# Patient Record
Sex: FEMALE | Race: WHITE | NOT HISPANIC OR LATINO | Employment: OTHER | URBAN - METROPOLITAN AREA
[De-identification: names, ages, dates, MRNs, and addresses within clinical notes are randomized per-mention and may not be internally consistent; named-entity substitution may affect disease eponyms.]

---

## 2018-01-18 NOTE — MISCELLANEOUS
Reason For Visit  Reason For Visit Free Text Note Form: Received message that daughter Kaleigh Sage is requesting call from   Call to Kaleigh Sage and left message  Will continue to be available to provide support  Active Problems    1  Chronic obstructive pulmonary disease, unspecified COPD type (496) (J44 9)   2  Degenerative joint disease, multiple joints on both sides of body (715 89) (M15 9)   3  Depression with anxiety (300 4) (F41 8)   4  Gait disturbance (781 2) (R26 9)   5  Malignant neoplasm of lower lobe of right lung (162 5) (C34 31)   6  Mild cognitive impairment (331 83) (G31 84)   7  Need for immunization against influenza (V04 81) (Z23)   8  Need for vaccination with 13-polyvalent pneumococcal conjugate vaccine (V03 82) (Z23)   9  Urine incontinence (788 30) (R32)    Current Meds   1  Advair Diskus 500-50 MCG/DOSE Inhalation Aerosol Powder Breath Activated; Inhale 1   puff twice daily; Therapy: 30CMJ0773 to (Last Rx:16Nov2015)  Requested for: 14SRE4194 Ordered   2  Citalopram Hydrobromide 10 MG Oral Tablet; take 1 tablet by mouth once daily; Therapy: 96MLQ2830 to (Last Rx:05Oct2015)  Requested for: 05Oct2015 Ordered   3  Mirtazapine 7 5 MG Oral Tablet; take one tab one hour before bed; Therapy: 26AHR8906 to (Last Rx:05Oct2015)  Requested for: 05Oct2015 Ordered   4  Spiriva HandiHaler 18 MCG Inhalation Capsule; INHALE CONTENTS OF 1 CAPSULE   ONCE DAILY; Therapy: 53SMF0003 to (Evaluate:10Nov2016)  Requested for: 49MTX5876; Last   Rx:16Nov2015 Ordered    Allergies    1   Penicillins   2  sulfa    Signatures   Electronically signed by : JEFF Bosch; Jorge Luis 15 2016 10:58AM EST                       (Author)

## 2018-09-24 ENCOUNTER — TELEPHONE (OUTPATIENT)
Dept: FAMILY MEDICINE CLINIC | Facility: CLINIC | Age: 78
End: 2018-09-24

## 2018-09-25 ENCOUNTER — TELEPHONE (OUTPATIENT)
Dept: FAMILY MEDICINE CLINIC | Facility: CLINIC | Age: 78
End: 2018-09-25

## 2018-11-06 ENCOUNTER — HOSPITAL ENCOUNTER (EMERGENCY)
Facility: HOSPITAL | Age: 78
Discharge: HOME | End: 2018-11-06
Attending: EMERGENCY MEDICINE | Admitting: EMERGENCY MEDICINE
Payer: MEDICARE

## 2018-11-06 ENCOUNTER — APPOINTMENT (EMERGENCY)
Dept: RADIOLOGY | Facility: HOSPITAL | Age: 78
End: 2018-11-06
Payer: MEDICARE

## 2018-11-06 VITALS
DIASTOLIC BLOOD PRESSURE: 60 MMHG | RESPIRATION RATE: 18 BRPM | TEMPERATURE: 96.8 F | BODY MASS INDEX: 40.48 KG/M2 | SYSTOLIC BLOOD PRESSURE: 128 MMHG | HEIGHT: 62 IN | WEIGHT: 220 LBS | HEART RATE: 70 BPM | OXYGEN SATURATION: 100 %

## 2018-11-06 DIAGNOSIS — M87.052 AVASCULAR NECROSIS OF BONES OF BOTH HIPS (HCC): Primary | ICD-10-CM

## 2018-11-06 DIAGNOSIS — M87.051 AVASCULAR NECROSIS OF BONES OF BOTH HIPS (HCC): Primary | ICD-10-CM

## 2018-11-06 PROCEDURE — 99284 EMERGENCY DEPT VISIT MOD MDM: CPT

## 2018-11-06 PROCEDURE — 72170 X-RAY EXAM OF PELVIS: CPT

## 2018-11-06 RX ORDER — IBUPROFEN 600 MG/1
600 TABLET ORAL EVERY 6 HOURS PRN
Qty: 30 TABLET | Refills: 0 | Status: SHIPPED | OUTPATIENT
Start: 2018-11-06

## 2018-11-06 RX ORDER — POTASSIUM CHLORIDE 750 MG/1
10 TABLET, EXTENDED RELEASE ORAL DAILY
COMMUNITY
End: 2018-12-28 | Stop reason: HOSPADM

## 2018-11-06 RX ORDER — CITALOPRAM 20 MG/1
20 TABLET ORAL DAILY
COMMUNITY

## 2018-11-06 RX ORDER — DONEPEZIL HYDROCHLORIDE 10 MG/1
10 TABLET, FILM COATED ORAL
COMMUNITY

## 2018-11-06 RX ORDER — ALBUTEROL SULFATE 90 UG/1
2 AEROSOL, METERED RESPIRATORY (INHALATION) EVERY 4 HOURS PRN
COMMUNITY

## 2018-11-06 RX ORDER — FUROSEMIDE 40 MG/1
40 TABLET ORAL DAILY
COMMUNITY
Start: 2018-11-02 | End: 2018-12-28 | Stop reason: HOSPADM

## 2018-11-06 RX ORDER — BIOTIN 1 MG
1 TABLET ORAL DAILY
COMMUNITY

## 2018-11-06 RX ORDER — BUDESONIDE AND FORMOTEROL FUMARATE DIHYDRATE 160; 4.5 UG/1; UG/1
2 AEROSOL RESPIRATORY (INHALATION) 2 TIMES DAILY
COMMUNITY

## 2018-11-06 RX ORDER — RISPERIDONE 0.5 MG/1
0.5 TABLET, FILM COATED ORAL
COMMUNITY

## 2018-11-06 RX ADMIN — IBUPROFEN 600 MG: 400 TABLET ORAL at 09:30

## 2018-11-06 NOTE — DISCHARGE INSTRUCTIONS
Arthritis   WHAT YOU NEED TO KNOW:   What is arthritis? Arthritis is a disease that causes inflammation in one or more joints  There are many types of arthritis, such as osteoarthritis, rheumatoid arthritis, and septic arthritis  Some types cause inflammation in the joints  Other types wear away the cartilage between joints  This makes the bones of the joint rub together when you move the joint  Your symptoms may be constant, or symptoms may come and go  Arthritis often gets worse over time and can cause permanent joint damage  What increases my risk for arthritis? · A family history of arthritis    · Infection, trauma, or injury to the joint    · Obesity    · A disease such as diabetes, heart disease, hypothyroidism, or psoriasis    · An immune deficiency disorder, such as AIDS or lupus  What are the signs and symptoms of arthritis? · Pain, swelling, or stiffness in the joint    · Limited range of motion in the joint    · Warmth or redness over the joint    · Tenderness when you touch the joint    · Stiff joints in the morning that loosen with movement    · A creaking or grinding sound when you move the joint    · Fever  How is arthritis diagnosed? · Blood tests  are used to measure the amount of inflammation in your body  · An x-ray, CT, MRI, or ultrasound  may be used to check for joint damage, swelling, or loss of bone  Do not enter the MRI room with anything metal  Metal can cause serious damage  Tell the healthcare provider if you have any metal in or on your body  · A sample  of the fluid in the joint may be tested for uric acid or calcium crystals, or for signs of infection  How is arthritis treated? Treatment will depend on the type of arthritis you have and if it is severe  You may need any of the following:  · Acetaminophen  decreases pain and fever  It is available without a doctor's order  Ask how much to take and how often to take it  Follow directions   Acetaminophen can cause liver damage if not taken correctly  · NSAIDs , such as ibuprofen, help decrease swelling, pain, and fever  This medicine is available with or without a doctor's order  NSAIDs can cause stomach bleeding or kidney problems in certain people  If you take blood thinner medicine, always ask your healthcare provider if NSAIDs are safe for you  Always read the medicine label and follow directions  · Steroid medicine  helps reduce swelling and pain  · Surgery  may be needed to repair or replace a damaged joint  What can I do to manage arthritis? · Rest your painful joint so it can heal   Your healthcare provider may recommend crutches or a walker if the affected joint is in a leg  · Apply ice or heat to the joint  Both can help decrease swelling and pain  Ice may also help prevent tissue damage  Use an ice pack, or put crushed ice in a plastic bag  Cover it with a towel and place it on your joint for 15 to 20 minutes every hour or as directed  You can apply heat for 20 minutes every 2 hours  Heat treatment includes hot packs or heat lamps  · Elevate your joint  Elevation helps reduce swelling and pain  Raise your joint above the level of your heart as often as you can  Prop your painful joint on pillows to keep it above your heart comfortably  · Go to therapy as directed  A physical therapist can teach you exercises to improve flexibility and range of motion  You may also be shown non-weight-bearing exercises that are safe for your joints, such as swimming  Exercise can help keep your joints flexible and reduce pain  An occupational therapist can help you learn to do your daily activities when your joints are stiff or sore  · Maintain a healthy weight  Extra weight puts increased pressure on your joints  Ask your healthcare provider what you should weigh   If you need to lose weight, he can help you create a weight loss program  Weight loss can help reduce pain and increase your ability to do your activities  The amount of exercise you do may vary each day, depending on your symptoms  · Wear flat or low-heeled shoes  This will help decrease pain and reduce pressure on your ankle, knee, and hip joints  · Use support devices  You may be given splints to wear on your hands to help your joints rest and to decrease inflammation  While you sleep, use a pillow that is firm enough to support your neck and head  What other equipment should I use? The following may help you move and prevent falls:  · Orthotic shoes or insoles  help support your feet when you walk  · Crutches, a cane, or a walker  may help decrease your risk for falling  They also decrease stress on affected joints  · Devices to prevent falls  include raised toilet seats and bathtub bars to help you get up from sitting  Handrails can be placed in areas where you need balance and support  When should I seek immediate care? · You have a fever and severe joint pain or swelling  · You cannot move the affected joint  · You have severe joint pain you cannot tolerate  When should I contact my healthcare provider? · Your pain or swelling does not get better with treatment  · You have questions or concerns about your condition or care  CARE AGREEMENT:   You have the right to help plan your care  Learn about your health condition and how it may be treated  Discuss treatment options with your caregivers to decide what care you want to receive  You always have the right to refuse treatment  The above information is an  only  It is not intended as medical advice for individual conditions or treatments  Talk to your doctor, nurse or pharmacist before following any medical regimen to see if it is safe and effective for you  © 2017 [employer] Information is for End User's use only and may not be sold, redistributed or otherwise used for commercial purposes   All illustrations and images included in CareNotes® are the copyrighted property of [employer]  or [employer]  Hip Pain   WHAT YOU NEED TO KNOW:   What causes hip pain? Hip pain can be caused by a number of conditions, such as bursitis, arthritis, or muscle or tendon strain  You may have swelling in the fluid-filled sacs that protect your muscles and tendons  Hip pain can also be caused by a lower back problem  Hip pain may be caused by trauma, playing sports, or running  Your pain may start in your hip and go to your thigh, buttock, or groin  How is hip pain treated? You may need x-rays to make sure there are no broken bones  You may be given NSAIDs to help decrease pain and swelling  How can I manage hip pain? · Rest  your injured hip so that it can heal  You may need to avoid putting any weight on your hip for at least 48 hours  Return to normal activities as directed  · Ice  the injury for 20 minutes every 4 hours, or as directed  Use an ice pack, or put crushed ice in a plastic bag  Cover it with a towel to protect your skin  Ice helps prevent tissue damage and decreases swelling and pain  · Elevate  your injured hip above the level of your heart as often as you can  This will help decrease swelling and pain  If possible, prop your hip and leg on pillows or blankets to keep the area elevated comfortably  · Maintain a healthy weight  Extra body weight can cause pressure and pain in your hip, knee, and ankle joints  Ask your healthcare provider how much you should weigh  Ask him to help you create a weight loss plan if you are overweight  · Use assistive devices as directed  You may need to use a cane or crutches  Assistive devices help decrease pain and pressure on your hip when you walk  Ask your healthcare provider for more information about assistive devices and how to use them correctly  When should I seek immediate care? · Your pain gets worse  · You have numbness in your leg or toes      · You cannot put any weight on or move your hip  When should I contact my healthcare provider? · You have a fever  · Your pain does not decrease, even after treatment  · You have questions or concerns about your condition or care  CARE AGREEMENT:   You have the right to help plan your care  Learn about your health condition and how it may be treated  Discuss treatment options with your caregivers to decide what care you want to receive  You always have the right to refuse treatment  The above information is an  only  It is not intended as medical advice for individual conditions or treatments  Talk to your doctor, nurse or pharmacist before following any medical regimen to see if it is safe and effective for you  © 2017 [facility] Information is for End User's use only and may not be sold, redistributed or otherwise used for commercial purposes  All illustrations and images included in CareNotes® are the copyrighted property of [employer]  or [employer]

## 2018-11-06 NOTE — ED NOTES
Patient assisted to standing position and then ambulated, slowly, with roller walker with stand by assist  Patient appeared steady after first few steps needing reminder at first what to do        Kassandra Guillaume RN  11/06/18 2572

## 2018-11-06 NOTE — ED TRIAGE NOTES
Patient arrives with EMS from [facility]  Staff states patient is newly unable to walk due to left hip and right leg pain  Patient states has "old age" Tylenol was administered an hour prior to arrival  Denies trauma  Chronically wears 2L O2 at home  Hx- Dementia, COPD, gait disturbance

## 2018-11-06 NOTE — ED PROVIDER NOTES
History  Chief Complaint   Patient presents with    Hip Pain     Patient is an 78-year-old woman who has a chronic gait disturbance as well as hip pain who says that her hip pain is somewhat worse for the past several days and it is difficult for her to ambulate  Patient has had no recent injuries  She says she is able to transfer  She has no fevers chills  No nausea vomiting  No other complaints  She says she was sent here by the nursing home nurses            Prior to Admission Medications   Prescriptions Last Dose Informant Patient Reported? Taking? Cholecalciferol (VITAMIN D3) 1000 units CAPS   Yes Yes   Sig: Take 1 capsule by mouth daily   albuterol (PROVENTIL HFA,VENTOLIN HFA) 90 mcg/act inhaler   Yes Yes   Sig: Inhale 2 puffs every 4 (four) hours as needed for wheezing   budesonide-formoterol (SYMBICORT) 160-4 5 mcg/act inhaler   Yes Yes   Sig: Inhale 2 puffs 2 (two) times a day Rinse mouth after use  citalopram (CeleXA) 20 mg tablet   Yes Yes   Sig: Take 10 mg by mouth daily   donepezil (ARICEPT) 10 mg tablet   Yes Yes   Sig: Take 10 mg by mouth daily at bedtime   furosemide (LASIX) 40 mg tablet   Yes Yes   Sig: Take 40 mg by mouth daily For 7 days for edema   potassium chloride (K-DUR,KLOR-CON) 10 mEq tablet   Yes Yes   Sig: Take 10 mEq by mouth daily   risperiDONE (RisperDAL) 0 5 mg tablet   Yes Yes   Sig: Take 0 5 mg by mouth daily at bedtime   umeclidinium bromide (INCRUSE ELLIPTA) 62 5 mcg/inh AEPB inhaler   Yes Yes   Sig: Inhale 1 puff daily      Facility-Administered Medications: None       Past Medical History:   Diagnosis Date    Anxiety     COPD (chronic obstructive pulmonary disease) (HCC)     Dementia     DJD (degenerative joint disease)     Gait disturbance     Hypertension     Insomnia     Major depression     Vitamin B12 deficiency     Vitamin D deficiency        History reviewed  No pertinent surgical history  History reviewed  No pertinent family history    I have reviewed and agree with the history as documented  Social History   Substance Use Topics    Smoking status: Unknown If Ever Smoked    Smokeless tobacco: Never Used    Alcohol use No        Review of Systems   Constitutional: Negative for chills, fatigue, fever and unexpected weight change  HENT: Negative for congestion and nosebleeds  Eyes: Negative for visual disturbance  Respiratory: Negative for chest tightness and shortness of breath  Cardiovascular: Negative for chest pain, palpitations and leg swelling  Gastrointestinal: Negative for abdominal pain, blood in stool, diarrhea, nausea and vomiting  Endocrine: Negative for cold intolerance and heat intolerance  Genitourinary: Negative for difficulty urinating  Musculoskeletal: Negative for arthralgias, back pain, gait problem, joint swelling and myalgias  Skin: Negative for rash  Neurological: Negative for dizziness, speech difficulty, weakness and headaches  Psychiatric/Behavioral: Negative for behavioral problems, confusion, self-injury and suicidal ideas  All other systems reviewed and are negative  Physical Exam  Physical Exam   Constitutional: She is oriented to person, place, and time  She appears well-developed and well-nourished  HENT:   Head: Normocephalic and atraumatic  Nose: Nose normal    Eyes: Pupils are equal, round, and reactive to light  EOM are normal    Neck: Normal range of motion  Neck supple  Cardiovascular: Normal rate, regular rhythm and normal heart sounds  Exam reveals no gallop and no friction rub  No murmur heard  Pulmonary/Chest: Effort normal and breath sounds normal  No respiratory distress  She has no wheezes  She has no rales  Abdominal: Soft  She exhibits no distension  There is no tenderness  There is no rebound and no guarding  Musculoskeletal: Normal range of motion  She exhibits edema (1+)  She exhibits no deformity     Neurological: She is alert and oriented to person, place, and time  Skin: Skin is warm and dry  Psychiatric: She has a normal mood and affect  Her behavior is normal  Judgment and thought content normal    Nursing note and vitals reviewed  Vital Signs  ED Triage Vitals   Temperature Pulse Respirations Blood Pressure SpO2   11/06/18 0855 11/06/18 0855 11/06/18 0855 11/06/18 0855 11/06/18 0855   (!) 96 8 °F (36 °C) 70 18 128/60 100 %      Temp Source Heart Rate Source Patient Position - Orthostatic VS BP Location FiO2 (%)   11/06/18 0855 11/06/18 0855 11/06/18 0855 11/06/18 0855 --   Tympanic Monitor Lying Right arm       Pain Score       11/06/18 0930       5           Vitals:    11/06/18 0855   BP: 128/60   Pulse: 70   Patient Position - Orthostatic VS: Lying       Visual Acuity      ED Medications  Medications   ibuprofen (MOTRIN) tablet 600 mg (600 mg Oral Given 11/6/18 0930)       Diagnostic Studies  Results Reviewed     None                 XR pelvis ap only 1 or 2 vw   Final Result by Nora Carrasco (11/06 0943)   Avascular necrosis left femoral head with slight collapse and with   superimposed DJD  Signed by Ronan Alexandre MD                 Procedures  Procedures       Phone Contacts  ED Phone Contact    ED Course  ED Course as of Nov 06 1237   Tue Nov 06, 2018   0957 I spoke with her NH who was aware her of avascular necrosis  And that she is able to ambulate with a walker  She is safe for d/c                                MDM  CritCare Time    Disposition  Final diagnoses:   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 ([address] )     Time reflects when diagnosis was documented in both MDM as applicable and the Disposition within this note     Time User Action Codes Description Comment    11/6/2018 10:05 AM Pancho Summers Add [M87 051,  M87 052] 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 [facility])       ED Disposition     ED Disposition Condition Comment    Discharge  [place] discharge to home/self care      Condition at discharge: Good        Follow-up Information     Follow up With Specialties Details [address], MD Geriatric Medicine   [place] [id number]      Jeanna Menjivar DO Orthopedic Surgery   [address]  [facility]  [address] [address]  [address] [id number] [phone]            Discharge Medication List as of 11/6/2018 10:08 AM      START taking these medications    Details   ibuprofen (MOTRIN) 600 mg tablet Take 1 tablet (600 mg total) by mouth every 6 (six) hours as needed for moderate pain, Starting Tue 11/6/2018, Print         CONTINUE these medications which have NOT CHANGED    Details   albuterol (PROVENTIL HFA,VENTOLIN HFA) 90 mcg/act inhaler Inhale 2 puffs every 4 (four) hours as needed for wheezing, Historical Med      budesonide-formoterol (SYMBICORT) 160-4 5 mcg/act inhaler Inhale 2 puffs 2 (two) times a day Rinse mouth after use , Historical Med      Cholecalciferol (VITAMIN D3) 1000 units CAPS Take 1 capsule by mouth daily, Historical Med      citalopram (CeleXA) 20 mg tablet Take 10 mg by mouth daily, Historical Med      donepezil (ARICEPT) 10 mg tablet Take 10 mg by mouth daily at bedtime, Historical Med      furosemide (LASIX) 40 mg tablet Take 40 mg by mouth daily For 7 days for edema, Starting Fri 11/2/2018, Until Fri 11/9/2018, Historical Med      potassium chloride (K-DUR,KLOR-CON) 10 mEq tablet Take 10 mEq by mouth daily, Historical Med      risperiDONE (RisperDAL) 0 5 mg tablet Take 0 5 mg by mouth daily at bedtime, Historical Med      umeclidinium bromide (INCRUSE ELLIPTA) 62 5 mcg/inh AEPB inhaler Inhale 1 puff daily, Historical Med           No discharge procedures on file      ED Provider  Electronically Signed by           Kevin Norwood MD  11/06/18 Valentino Simpson MD  11/06/18 3534

## 2018-11-08 ENCOUNTER — HOSPITAL ENCOUNTER (EMERGENCY)
Facility: HOSPITAL | Age: 78
Discharge: HOME | End: 2018-11-08
Attending: EMERGENCY MEDICINE | Admitting: EMERGENCY MEDICINE
Payer: MEDICARE

## 2018-11-08 ENCOUNTER — APPOINTMENT (EMERGENCY)
Dept: CT IMAGING | Facility: HOSPITAL | Age: 78
End: 2018-11-08
Payer: MEDICARE

## 2018-11-08 VITALS
BODY MASS INDEX: 41.54 KG/M2 | TEMPERATURE: 98.2 F | SYSTOLIC BLOOD PRESSURE: 129 MMHG | RESPIRATION RATE: 16 BRPM | OXYGEN SATURATION: 95 % | HEIGHT: 61 IN | DIASTOLIC BLOOD PRESSURE: 59 MMHG | WEIGHT: 220.02 LBS | HEART RATE: 75 BPM

## 2018-11-08 DIAGNOSIS — R10.32 LEFT LOWER QUADRANT PAIN: Primary | ICD-10-CM

## 2018-11-08 LAB
ALBUMIN SERPL BCP-MCNC: 3.7 G/DL (ref 3.5–5)
ALP SERPL-CCNC: 59 U/L (ref 46–116)
ALT SERPL W P-5'-P-CCNC: 18 U/L (ref 12–78)
ANION GAP SERPL CALCULATED.3IONS-SCNC: 6 MMOL/L (ref 4–13)
AST SERPL W P-5'-P-CCNC: 29 U/L (ref 5–45)
BASOPHILS # BLD AUTO: 0.03 THOUSANDS/ΜL (ref 0–0.1)
BASOPHILS NFR BLD AUTO: 1 % (ref 0–1)
BILIRUB DIRECT SERPL-MCNC: 0.09 MG/DL (ref 0–0.2)
BILIRUB SERPL-MCNC: 0.8 MG/DL (ref 0.2–1)
BILIRUB UR QL STRIP: NEGATIVE
BUN SERPL-MCNC: 19 MG/DL (ref 5–25)
CALCIUM SERPL-MCNC: 9 MG/DL (ref 8.3–10.1)
CHLORIDE SERPL-SCNC: 100 MMOL/L (ref 100–108)
CLARITY UR: CLEAR
CO2 SERPL-SCNC: 34 MMOL/L (ref 21–32)
COLOR UR: YELLOW
CREAT SERPL-MCNC: 1.03 MG/DL (ref 0.6–1.3)
EOSINOPHIL # BLD AUTO: 0.04 THOUSAND/ΜL (ref 0–0.61)
EOSINOPHIL NFR BLD AUTO: 1 % (ref 0–6)
ERYTHROCYTE [DISTWIDTH] IN BLOOD BY AUTOMATED COUNT: 13.2 % (ref 11.6–15.1)
GFR SERPL CREATININE-BSD FRML MDRD: 51 ML/MIN/1.73SQ M
GLUCOSE SERPL-MCNC: 103 MG/DL (ref 65–140)
GLUCOSE UR STRIP-MCNC: NEGATIVE MG/DL
HCT VFR BLD AUTO: 43.6 % (ref 34.8–46.1)
HGB BLD-MCNC: 14.1 G/DL (ref 11.5–15.4)
HGB UR QL STRIP.AUTO: NEGATIVE
IMM GRANULOCYTES # BLD AUTO: 0.03 THOUSAND/UL (ref 0–0.2)
IMM GRANULOCYTES NFR BLD AUTO: 1 % (ref 0–2)
KETONES UR STRIP-MCNC: NEGATIVE MG/DL
LEUKOCYTE ESTERASE UR QL STRIP: NEGATIVE
LYMPHOCYTES # BLD AUTO: 0.68 THOUSANDS/ΜL (ref 0.6–4.47)
LYMPHOCYTES NFR BLD AUTO: 12 % (ref 14–44)
MCH RBC QN AUTO: 29.7 PG (ref 26.8–34.3)
MCHC RBC AUTO-ENTMCNC: 32.3 G/DL (ref 31.4–37.4)
MCV RBC AUTO: 92 FL (ref 82–98)
MONOCYTES # BLD AUTO: 0.39 THOUSAND/ΜL (ref 0.17–1.22)
MONOCYTES NFR BLD AUTO: 7 % (ref 4–12)
NEUTROPHILS # BLD AUTO: 4.67 THOUSANDS/ΜL (ref 1.85–7.62)
NEUTS SEG NFR BLD AUTO: 78 % (ref 43–75)
NITRITE UR QL STRIP: NEGATIVE
NRBC BLD AUTO-RTO: 0 /100 WBCS
PH UR STRIP.AUTO: 5.5 [PH] (ref 4.5–8)
PLATELET # BLD AUTO: 260 THOUSANDS/UL (ref 149–390)
PMV BLD AUTO: 10.5 FL (ref 8.9–12.7)
POTASSIUM SERPL-SCNC: 5.3 MMOL/L (ref 3.5–5.3)
PROT SERPL-MCNC: 8.2 G/DL (ref 6.4–8.2)
PROT UR STRIP-MCNC: NEGATIVE MG/DL
RBC # BLD AUTO: 4.75 MILLION/UL (ref 3.81–5.12)
SODIUM SERPL-SCNC: 140 MMOL/L (ref 136–145)
SP GR UR STRIP.AUTO: <=1.005 (ref 1–1.03)
UROBILINOGEN UR QL STRIP.AUTO: 0.2 E.U./DL
WBC # BLD AUTO: 5.84 THOUSAND/UL (ref 4.31–10.16)

## 2018-11-08 PROCEDURE — 80048 BASIC METABOLIC PNL TOTAL CA: CPT | Performed by: EMERGENCY MEDICINE

## 2018-11-08 PROCEDURE — 74177 CT ABD & PELVIS W/CONTRAST: CPT

## 2018-11-08 PROCEDURE — 85025 COMPLETE CBC W/AUTO DIFF WBC: CPT | Performed by: EMERGENCY MEDICINE

## 2018-11-08 PROCEDURE — 81003 URINALYSIS AUTO W/O SCOPE: CPT | Performed by: EMERGENCY MEDICINE

## 2018-11-08 PROCEDURE — 36415 COLL VENOUS BLD VENIPUNCTURE: CPT | Performed by: EMERGENCY MEDICINE

## 2018-11-08 PROCEDURE — 99285 EMERGENCY DEPT VISIT HI MDM: CPT

## 2018-11-08 PROCEDURE — 80076 HEPATIC FUNCTION PANEL: CPT | Performed by: EMERGENCY MEDICINE

## 2018-11-08 RX ORDER — ACETAMINOPHEN 500 MG
500 TABLET ORAL EVERY 6 HOURS PRN
Qty: 30 TABLET | Refills: 0 | Status: SHIPPED | OUTPATIENT
Start: 2018-11-08

## 2018-11-08 RX ORDER — ACETAMINOPHEN 500 MG
500 TABLET ORAL EVERY 6 HOURS PRN
Qty: 30 TABLET | Refills: 0 | Status: SHIPPED | OUTPATIENT
Start: 2018-11-08 | End: 2018-11-08

## 2018-11-08 RX ORDER — ACETAMINOPHEN 325 MG/1
650 TABLET ORAL ONCE
Status: COMPLETED | OUTPATIENT
Start: 2018-11-08 | End: 2018-11-08

## 2018-11-08 RX ADMIN — IOHEXOL 100 ML: 350 INJECTION, SOLUTION INTRAVENOUS at 15:29

## 2018-11-08 RX ADMIN — ACETAMINOPHEN 650 MG: 325 TABLET, FILM COATED ORAL at 14:33

## 2018-11-08 NOTE — DISCHARGE INSTRUCTIONS
Acute Abdominal Pain   AMBULATORY CARE:   Acute abdominal pain  usually starts suddenly and gets worse quickly  Seek care immediately if:   · You vomit blood or cannot stop vomiting  · You have blood in your bowel movement or it looks like tar  · You have bleeding from your rectum  · Your abdomen is larger than usual, more painful, and hard  · You have severe pain in your abdomen  · You stop passing gas and having bowel movements  · You feel weak, dizzy, or faint  Contact your healthcare provider if:   · You have a fever  · You have new signs and symptoms  · Your symptoms do not get better with treatment  · You have questions or concerns about your condition or care  Treatment for acute abdominal pain  may depend on the cause of your abdominal pain  You may need any of the following:  · Medicines  may be given to decrease pain, treat an infection, and manage your symptoms, such as constipation  · Surgery  may be needed to treat a serious cause of abdominal pain  Examples include surgery to treat appendicitis or a blockage in your bowels  Manage your symptoms:   · Apply heat  on your abdomen for 20 to 30 minutes every 2 hours for as many days as directed  Heat helps decrease pain and muscle spasms  · Manage your stress  Stress may cause abdominal pain  Your healthcare provider may recommend relaxation techniques and deep breathing exercises to help decrease your stress  Your healthcare provider may recommend you talk to someone about your stress or anxiety, such as a counselor or a trusted friend  Get plenty of sleep and exercise regularly  · Limit or do not drink alcohol  Alcohol can make your abdominal pain worse  Ask your healthcare provider if it is safe for you to drink alcohol  Also ask how much is safe for you to drink  · Do not smoke  Nicotine and other chemicals in cigarettes can damage your esophagus and stomach   Ask your healthcare provider for information if you currently smoke and need help to quit  E-cigarettes or smokeless tobacco still contain nicotine  Talk to your healthcare provider before you use these products  Make changes to the food you eat as directed:  Do not eat foods that cause abdominal pain or other symptoms  Eat small meals more often  · Eat more high-fiber foods if you are constipated  High-fiber foods include fruits, vegetables, whole-grain foods, and legumes  · Do not eat foods that cause gas if you have bloating  Examples include broccoli, cabbage, and cauliflower  Do not drink soda or carbonated drinks, because these may also cause gas  · Do not eat foods or drinks that contain sorbitol or fructose if you have diarrhea and bloating  Some examples are fruit juices, candy, jelly, and sugar-free gum  · Do not eat high-fat foods, such as fried foods, cheeseburgers, hot dogs, and desserts  · Limit or do not drink caffeine  Caffeine may make symptoms, such as heart burn or nausea, worse  · Drink plenty of liquids to prevent dehydration from diarrhea or vomiting  Ask your healthcare provider how much liquid to drink each day and which liquids are best for you  Follow up with your healthcare provider as directed:  Write down your questions so you remember to ask them during your visits  © 2017 [employer] Information is for End User's use only and may not be sold, redistributed or otherwise used for commercial purposes  All illustrations and images included in CareNotes® are the copyrighted property of [employer]  or [employer]  The above information is an  only  It is not intended as medical advice for individual conditions or treatments  Talk to your doctor, nurse or pharmacist before following any medical regimen to see if it is safe and effective for you

## 2018-11-08 NOTE — ED PROVIDER NOTES
History  Chief Complaint   Patient presents with    Weakness - Generalized     pt brought in by EMS from [facility] care, according to Goyo Meza patient has a change in mental status and is complaining of belly pain  Patient was seen at [place] on 11/6 and at [facility] on 11/7  Katrina Gan When asked patient why she is here, she states i dont know, everything hurts  HPI  15-year-old female past medical history left hip avascular necrosis presenting with abdominal pain which she states is in her left lower quadrant and started today  She has history of dementia and states that she cannot remember if she had this before  She was seen at the ED in the [place] 2 days ago for left hip pain, the x-ray showed avascular necrosis of hips which has been chronic  History limited due to dementia  Prior to Admission Medications   Prescriptions Last Dose Informant Patient Reported? Taking? Cholecalciferol (VITAMIN D3) 1000 units CAPS   Yes No   Sig: Take 1 capsule by mouth daily   albuterol (PROVENTIL HFA,VENTOLIN HFA) 90 mcg/act inhaler   Yes No   Sig: Inhale 2 puffs every 4 (four) hours as needed for wheezing   budesonide-formoterol (SYMBICORT) 160-4 5 mcg/act inhaler   Yes No   Sig: Inhale 2 puffs 2 (two) times a day Rinse mouth after use     citalopram (CeleXA) 20 mg tablet   Yes No   Sig: Take 10 mg by mouth daily   donepezil (ARICEPT) 10 mg tablet   Yes No   Sig: Take 10 mg by mouth daily at bedtime   furosemide (LASIX) 40 mg tablet   Yes No   Sig: Take 40 mg by mouth daily For 7 days for edema   ibuprofen (MOTRIN) 600 mg tablet   No No   Sig: Take 1 tablet (600 mg total) by mouth every 6 (six) hours as needed for moderate pain   potassium chloride (K-DUR,KLOR-CON) 10 mEq tablet   Yes No   Sig: Take 10 mEq by mouth daily   risperiDONE (RisperDAL) 0 5 mg tablet   Yes No   Sig: Take 0 5 mg by mouth daily at bedtime   umeclidinium bromide (INCRUSE ELLIPTA) 62 5 mcg/inh AEPB inhaler   Yes No   Sig: Inhale 1 puff daily Facility-Administered Medications: None       Past Medical History:   Diagnosis Date    Anxiety     COPD (chronic obstructive pulmonary disease) (HCC)     Dementia     DJD (degenerative joint disease)     Gait disturbance     Hypertension     Insomnia     Major depression     Vitamin B12 deficiency     Vitamin D deficiency        History reviewed  No pertinent surgical history  History reviewed  No pertinent family history  I have reviewed and agree with the history as documented  Social History   Substance Use Topics    Smoking status: Unknown If Ever Smoked    Smokeless tobacco: Never Used    Alcohol use No        Review of Systems   Unable to perform ROS: Dementia   Gastrointestinal: Positive for abdominal pain  Physical Exam  Physical Exam   Constitutional: She appears well-developed and well-nourished  No distress  HENT:   Head: Atraumatic  Right Ear: External ear normal    Left Ear: External ear normal    Nose: Nose normal    Eyes: Pupils are equal, round, and reactive to light  Conjunctivae and EOM are normal    Neck: Normal range of motion  Neck supple  No JVD present  Cardiovascular: Normal rate, regular rhythm and normal heart sounds  No murmur heard  Pulmonary/Chest: Effort normal and breath sounds normal  No respiratory distress  She has no wheezes  Abdominal: Soft  Bowel sounds are normal  She exhibits no distension  There is no tenderness  No appreciable tenderness to palpation on exam   Musculoskeletal: Normal range of motion  She exhibits no edema  Neurological: She is alert  No cranial nerve deficit  Oriented to person and place but not time   Skin: Skin is warm and dry  Capillary refill takes less than 2 seconds  She is not diaphoretic  Psychiatric: She has a normal mood and affect  Her behavior is normal    Nursing note and vitals reviewed        Vital Signs  ED Triage Vitals [11/08/18 1359]   Temperature Pulse Respirations Blood Pressure SpO2   98 2 °F (36 8 °C) 67 16 130/67 97 %      Temp Source Heart Rate Source Patient Position - Orthostatic VS BP Location FiO2 (%)   Oral Monitor Sitting Right arm --      Pain Score       --           Vitals:    11/08/18 1359 11/08/18 1500   BP: 130/67 105/55   Pulse: 67 60   Patient Position - Orthostatic VS: Sitting Lying       Visual Acuity      ED Medications  Medications   acetaminophen (TYLENOL) tablet 650 mg (650 mg Oral Given 11/8/18 1433)   iohexol (OMNIPAQUE) 350 MG/ML injection (MULTI-DOSE) 100 mL (100 mL Intravenous Given 11/8/18 1529)       Diagnostic Studies  Results Reviewed     Procedure Component Value Units Date/Time    Basic metabolic panel [[id number]]  (Abnormal) Collected:  11/08/18 1430    Lab Status:  Final result Specimen:  Blood from Arm, Left Updated:  11/08/18 1454     Sodium 140 mmol/L      Potassium 5 3 mmol/L      Chloride 100 mmol/L      CO2 34 (H) mmol/L      ANION GAP 6 mmol/L      BUN 19 mg/dL      Creatinine 1 03 mg/dL      Glucose 103 mg/dL      Calcium 9 0 mg/dL      eGFR 51 ml/min/1 73sq m     Narrative:         National Kidney Disease Education Program recommendations are as follows:  GFR calculation is accurate only with a steady state creatinine  Chronic Kidney disease less than 60 ml/min/1 73 sq  meters  Kidney failure less than 15 ml/min/1 73 sq  meters      Hepatic function panel [[id number]]  (Normal) Collected:  11/08/18 1430    Lab Status:  Final result Specimen:  Blood from Arm, Left Updated:  11/08/18 1454     Total Bilirubin 0 80 mg/dL      Bilirubin, Direct 0 09 mg/dL      Alkaline Phosphatase 59 U/L      AST 29 U/L      ALT 18 U/L      Total Protein 8 2 g/dL      Albumin 3 7 g/dL     UA (URINE) with reflex to Microscopic [[id number]] Collected:  11/08/18 1442    Lab Status:  Final result Specimen:  Urine from Urine, Clean Catch Updated:  11/08/18 1452     Color, UA Yellow     Clarity, UA Clear     Specific Gravity, UA <=1 005     pH, UA 5 5     Leukocytes, UA Negative Nitrite, UA Negative     Protein, UA Negative mg/dl      Glucose, UA Negative mg/dl      Ketones, UA Negative mg/dl      Urobilinogen, UA 0 2 E U /dl      Bilirubin, UA Negative     Blood, UA Negative    CBC and differential [[id number]]  (Abnormal) Collected:  11/08/18 1430    Lab Status:  Final result Specimen:  Blood from Arm, Left Updated:  11/08/18 1439     WBC 5 84 Thousand/uL      RBC 4 75 Million/uL      Hemoglobin 14 1 g/dL      Hematocrit 43 6 %      MCV 92 fL      MCH 29 7 pg      MCHC 32 3 g/dL      RDW 13 2 %      MPV 10 5 fL      Platelets 999 Thousands/uL      nRBC 0 /100 WBCs      Neutrophils Relative 78 (H) %      Immat GRANS % 1 %      Lymphocytes Relative 12 (L) %      Monocytes Relative 7 %      Eosinophils Relative 1 %      Basophils Relative 1 %      Neutrophils Absolute 4 67 Thousands/µL      Immature Grans Absolute 0 03 Thousand/uL      Lymphocytes Absolute 0 68 Thousands/µL      Monocytes Absolute 0 39 Thousand/µL      Eosinophils Absolute 0 04 Thousand/µL      Basophils Absolute 0 03 Thousands/µL                  CT abdomen pelvis with contrast   Final Result by Deandre Zarate MD (11/08 1547)      No acute intra-abdominal abnormality  No free air or free fluid  A few scattered colonic diverticula with no inflammatory changes present to suggest acute diverticulitis  Small sliding hiatal hernia  Cholelithiasis with no pericholecystic inflammatory change  Workstation performed: [id number]                    Procedures  Procedures       Phone Contacts  ED Phone Contact    ED Course           Identification of Seniors at Risk      Most Recent Value   (ISAR) Identification of Seniors at Risk   Before the illness or injury that brought you to the Emergency, did you need someone to help you on a regular basis?   1 Filed at: 11/08/2018 1359   In the last 24 hours, have you needed more help than usual?  1 Filed at: 11/08/2018 1359   Have you been hospitalized for one or more nights during the past 6 months? 0 Filed at: 11/08/2018 1356   In general, do you see well?  0 Filed at: 11/08/2018 1359   In general, do you have serious problems with your memory? 1 Filed at: 11/08/2018 4894   Do you take more than three different medications every day? 1 Filed at: 11/08/2018 135   ISAR Score  4 Filed at: 11/08/2018 1359                          MDM  Number of Diagnoses or Management Options  Left lower quadrant pain:   Diagnosis management comments: [de-identified] yo F with dementia presenting with LLQ abdominal pain  Unable to obtain full history due to dementia  Seen in ED two days ago for hip pain, diagnosed with AVN of both hips which is chronic  Labs unremarkable today for dehydration,  Normal LFTs, urine negative for UTI CT shows no acute abnormalities  Discussed plan with daughter at bedside, will rx tylenol, Will d/c back to NH with PCP follow up, consulted case management to discuss other options with daughter  Amount and/or Complexity of Data Reviewed  Clinical lab tests: ordered and reviewed  Tests in the medicine section of CPT®: ordered and reviewed      CritCare Time    Disposition  Final diagnoses:   Left lower quadrant pain     Time reflects when diagnosis was documented in both MDM as applicable and the Disposition within this note     Time User Action Codes Description Comment    11/8/2018  3:50 PM Iwona Almonte Add [R10 32] Left lower quadrant pain       ED Disposition     ED Disposition Condition Comment    Discharge  Doe Fariasmaddie discharge to home/self care      Condition at discharge: Good        Follow-up Information     Follow up With Specialties Details [address], MD Geriatric Medicine  As needed [facility]  [phone]            Patient's Medications   Discharge Prescriptions    ACETAMINOPHEN (TYLENOL) 500 MG TABLET    Take 1 tablet (500 mg total) by mouth every 6 (six) hours as needed for mild pain       Start Date: 11/8/2018 End Date: --       Order Dose: 500 mg       Quantity: 30 tablet    Refills: 0     No discharge procedures on file      ED Provider  Electronically Signed by           Eleuterio Blanc MD  11/08/18 [address] [place] [address] Road, MD  11/08/18 9545

## 2018-11-09 NOTE — SOCIAL WORK
ED Provider requested CM follow up with pt's daughter as pt is from [facility] and is having increasing pain with difficulty walking  Per ED Provider pt's daughter is overwhelmed and pt may need high LOC  CM attempted to call pt's daughter and lm  CM updated ED Provider       Romana Jack, LSW  11/9/18  2:41PM

## 2018-11-10 ENCOUNTER — HOSPITAL ENCOUNTER (EMERGENCY)
Facility: HOSPITAL | Age: 78
Discharge: HOME | End: 2018-11-10
Attending: FAMILY MEDICINE | Admitting: FAMILY MEDICINE
Payer: MEDICARE

## 2018-11-10 VITALS
WEIGHT: 220 LBS | TEMPERATURE: 97.8 F | RESPIRATION RATE: 16 BRPM | SYSTOLIC BLOOD PRESSURE: 107 MMHG | OXYGEN SATURATION: 97 % | BODY MASS INDEX: 38.98 KG/M2 | DIASTOLIC BLOOD PRESSURE: 62 MMHG | HEART RATE: 66 BPM | HEIGHT: 63 IN

## 2018-11-10 DIAGNOSIS — G89.29 HIP PAIN, CHRONIC, LEFT: Primary | ICD-10-CM

## 2018-11-10 DIAGNOSIS — M25.552 HIP PAIN, CHRONIC, LEFT: Primary | ICD-10-CM

## 2018-11-10 PROCEDURE — 99283 EMERGENCY DEPT VISIT LOW MDM: CPT

## 2018-11-10 RX ORDER — HYDROCODONE BITARTRATE AND ACETAMINOPHEN 5; 325 MG/1; MG/1
1 TABLET ORAL ONCE
Status: COMPLETED | OUTPATIENT
Start: 2018-11-10 | End: 2018-11-10

## 2018-11-10 RX ADMIN — HYDROCODONE BITARTRATE AND ACETAMINOPHEN 1 TABLET: 5; 325 TABLET ORAL at 08:30

## 2018-11-10 NOTE — DISCHARGE INSTRUCTIONS
Hip Pain   Hiren BJ & Drake Avalos CJ: Hip Rehabilitation  In: Christine Huang Nicky Hone, [facility], 2nd ed  [facility],[address] [place] [address] [address], 2014  Jeanmarie Carrillo [place]: Patient Information Sheets  In: Christine Huang Nicky Hone, [place]  [facility], 2nd ed  [facility],[address] [place] [address] [address], 2014  Jackson Segura KJ & Clint CL: Hip Pain in the Young: Femoroacetabular Impingement (SABRINA)  In: Christine Huang Nicky Hone, [place]  [facility] Orthopaed[facility], 2nd ed  [facility],[address] [place] [address] [address], 2014  Vinay Carr: Leg pain  In: Professional Guide to Signs & Symptoms, 6th ed  [facility],[address] [place] [address] [place], 2010  © 2017 [facility] Information is for End User's use only and may not be sold, redistributed or otherwise used for commercial purposes  All illustrations and images included in CareNotes® are the copyrighted property of [employer]  or [employer]  The above information is an  only  It is not intended as medical advice for individual conditions or treatments  Talk to your doctor, nurse or pharmacist before following any medical regimen to see if it is safe and effective for you

## 2018-11-10 NOTE — ED PROVIDER NOTES
History  Chief Complaint   Patient presents with    Hip Pain     Patient has had pain in both hips for several days  She was seen here for same problem and pain has not resolved  Tells staff she is unable to walk on her hips due to pain  History provided by:  Patient and EMS personnel   used: No     This is a [de-identified]year old female sent from assisted facility with complain of chronic left hip pain  Patient was seen in the ED multiple times in the last week for staple complain was diagnosed with chronic avascular necrosis of left femoral head and will discharge home with the ibuprofen  The patient presented back to the ED with similar complaints states that her pain is worse when she is walking  She denies any fall at this time  Denies any fever chills nausea vomiting this time  Patient states she took ibuprofen yesterday which did not alleviate her pain and that is what prompted her visit to the ED this morning  Prior to Admission Medications   Prescriptions Last Dose Informant Patient Reported? Taking? Cholecalciferol (VITAMIN D3) 1000 units CAPS   Yes No   Sig: Take 1 capsule by mouth daily   acetaminophen (TYLENOL) 500 mg tablet   No No   Sig: Take 1 tablet (500 mg total) by mouth every 6 (six) hours as needed for mild pain   albuterol (PROVENTIL HFA,VENTOLIN HFA) 90 mcg/act inhaler   Yes No   Sig: Inhale 2 puffs every 4 (four) hours as needed for wheezing   budesonide-formoterol (SYMBICORT) 160-4 5 mcg/act inhaler   Yes No   Sig: Inhale 2 puffs 2 (two) times a day Rinse mouth after use     citalopram (CeleXA) 20 mg tablet   Yes No   Sig: Take 10 mg by mouth daily   donepezil (ARICEPT) 10 mg tablet   Yes No   Sig: Take 10 mg by mouth daily at bedtime   furosemide (LASIX) 40 mg tablet   Yes No   Sig: Take 40 mg by mouth daily For 7 days for edema   ibuprofen (MOTRIN) 600 mg tablet   No No   Sig: Take 1 tablet (600 mg total) by mouth every 6 (six) hours as needed for moderate pain potassium chloride (K-DUR,KLOR-CON) 10 mEq tablet   Yes No   Sig: Take 10 mEq by mouth daily   risperiDONE (RisperDAL) 0 5 mg tablet   Yes No   Sig: Take 0 5 mg by mouth daily at bedtime   umeclidinium bromide (INCRUSE ELLIPTA) 62 5 mcg/inh AEPB inhaler   Yes No   Sig: Inhale 1 puff daily      Facility-Administered Medications: None       Past Medical History:   Diagnosis Date    Anxiety     COPD (chronic obstructive pulmonary disease) (HCC)     Dementia     DJD (degenerative joint disease)     Gait disturbance     Hypertension     Insomnia     Major depression     Vitamin B12 deficiency     Vitamin D deficiency        History reviewed  No pertinent surgical history  History reviewed  No pertinent family history  I have reviewed and agree with the history as documented  Social History   Substance Use Topics    Smoking status: Unknown If Ever Smoked    Smokeless tobacco: Never Used    Alcohol use No        Review of Systems   Constitutional: Negative  HENT: Negative  Respiratory: Negative  Cardiovascular: Negative  Musculoskeletal:        Left hip pain   Neurological: Negative  Psychiatric/Behavioral: Negative  Physical Exam  Physical Exam   Constitutional: She is oriented to person, place, and time  She appears well-developed and well-nourished  HENT:   Head: Normocephalic and atraumatic  Neck: Normal range of motion  Neck supple  Cardiovascular: Normal rate, regular rhythm and normal heart sounds  Pulmonary/Chest: Effort normal and breath sounds normal    Abdominal: Soft  Bowel sounds are normal  She exhibits no distension  There is no tenderness  Musculoskeletal: She exhibits tenderness (At the left hip region range of motion is intact  )  Neurological: She is alert and oriented to person, place, and time  Nursing note and vitals reviewed        Vital Signs  ED Triage Vitals [11/10/18 0820]   Temperature Pulse Respirations Blood Pressure SpO2   (!) 97 °F (36 1 °C) 66 16 136/52 95 %      Temp Source Heart Rate Source Patient Position - Orthostatic VS BP Location FiO2 (%)   Tympanic Monitor Lying Left arm --      Pain Score       4           Vitals:    11/10/18 0820 11/10/18 1015   BP: 136/52 107/62   Pulse: 66 66   Patient Position - Orthostatic VS: Lying Sitting       Visual Acuity      ED Medications  Medications   HYDROcodone-acetaminophen (NORCO) 5-325 mg per tablet 1 tablet (1 tablet Oral Given 11/10/18 0830)       Diagnostic Studies  Results Reviewed     None                 No orders to display              Procedures  Procedures       Phone Contacts  ED Phone Contact    ED Course  ED Course as of Nov 10 1035   Sat Nov 10, 2018   6581 I had detail and long conversation with patient daughter I recommend that she should follow up with orthopedic for avascular necrosis of the hip if the pain is persistent  Patient should use the wheelchair until she has a follow up with the orthopedic  I do not recommend narcotic at this time because this can lead to fall at the assisted living  Recommend to continue with Tylenol alternating with ibuprofen for pain  Daughter states that she does agree with this plan and will take the her mother to to see an orthopedic  MDM  CritCare Time    Disposition  Final diagnoses:   Hip pain, chronic, left     Time reflects when diagnosis was documented in both MDM as applicable and the Disposition within this note     Time User Action Codes Description Comment    11/10/2018  9:42 AM Mitzi Garland Add [Y64 358,  G89 29] Hip pain, chronic, left       ED Disposition     ED Disposition Condition Comment    Discharge  Veneda Fitting discharge to home/self care  Condition at discharge: Stable        Follow-up Information     Follow up With Specialties Details Why Contact Lenin Andino, DO Orthopedic Surgery In 2 days For hip pain [address] [facility] [address]  [address] [facility] [address] Patient's Medications   Discharge Prescriptions    No medications on file     No discharge procedures on file      ED Provider  Electronically Signed by           William Del Rosario MD  11/10/18 [address] [address] [address], [place]  11/10/18 2213

## 2018-11-11 ENCOUNTER — HOSPITAL ENCOUNTER (EMERGENCY)
Facility: HOSPITAL | Age: 78
Discharge: HOME | End: 2018-11-12
Attending: EMERGENCY MEDICINE | Admitting: EMERGENCY MEDICINE
Payer: MEDICARE

## 2018-11-11 ENCOUNTER — APPOINTMENT (EMERGENCY)
Dept: CT IMAGING | Facility: HOSPITAL | Age: 78
End: 2018-11-11
Payer: MEDICARE

## 2018-11-11 VITALS
OXYGEN SATURATION: 94 % | WEIGHT: 210.1 LBS | BODY MASS INDEX: 37.22 KG/M2 | SYSTOLIC BLOOD PRESSURE: 155 MMHG | DIASTOLIC BLOOD PRESSURE: 67 MMHG | HEART RATE: 69 BPM | RESPIRATION RATE: 20 BRPM | TEMPERATURE: 98.2 F

## 2018-11-11 DIAGNOSIS — M25.559 HIP PAIN: Primary | ICD-10-CM

## 2018-11-11 PROCEDURE — 73700 CT LOWER EXTREMITY W/O DYE: CPT

## 2018-11-11 PROCEDURE — 99284 EMERGENCY DEPT VISIT MOD MDM: CPT

## 2018-11-12 RX ORDER — DONEPEZIL HYDROCHLORIDE 10 MG/1
10 TABLET, FILM COATED ORAL
COMMUNITY
End: 2018-12-28 | Stop reason: HOSPADM

## 2018-11-12 NOTE — ED PROVIDER NOTES
History  Chief Complaint   Patient presents with    Hip Pain     per EMS - pt from [facility], hx of chronic left hip pain, increased pain today; denies fall     58-year-old female presents emergency department for re-evaluation of chronic hip pain  The patient states she did not want come to the hospital but her personal care home sent her anyway  The patient states her normal chronic left hip pain which is no worse than previous  Because the patient is here, again, the patient had a CT scan of the hip done to assess for any occult fracture and if not is present patient will be discharged home        History provided by:  Patient   used: No    Hip Pain   Severity:  Moderate  Onset quality:  Gradual  Timing:  Constant  Chronicity:  Chronic  Associated symptoms: no abdominal pain, no chest pain, no headaches and no rhinorrhea        Prior to Admission Medications   Prescriptions Last Dose Informant Patient Reported? Taking? Cholecalciferol (VITAMIN D3) 1000 units CAPS   Yes Yes   Sig: Take 1 capsule by mouth daily   acetaminophen (TYLENOL) 500 mg tablet   No Yes   Sig: Take 1 tablet (500 mg total) by mouth every 6 (six) hours as needed for mild pain   albuterol (PROVENTIL HFA,VENTOLIN HFA) 90 mcg/act inhaler   Yes Yes   Sig: Inhale 2 puffs every 4 (four) hours as needed for wheezing   budesonide-formoterol (SYMBICORT) 160-4 5 mcg/act inhaler   Yes Yes   Sig: Inhale 2 puffs 2 (two) times a day Rinse mouth after use     citalopram (CeleXA) 20 mg tablet   Yes Yes   Sig: Take 10 mg by mouth daily   cyanocobalamin 1000 MCG tablet   Yes Yes   Sig: Take 100 mcg by mouth daily   donepezil (ARICEPT) 10 mg tablet   Yes Yes   Sig: Take 10 mg by mouth daily at bedtime   donepezil (ARICEPT) 10 mg tablet   Yes Yes   Sig: Take 10 mg by mouth daily at bedtime   furosemide (LASIX) 40 mg tablet   Yes No   Sig: Take 40 mg by mouth daily For 7 days for edema   ibuprofen (MOTRIN) 600 mg tablet   No No   Sig: Take 1 tablet (600 mg total) by mouth every 6 (six) hours as needed for moderate pain   potassium chloride (K-DUR,KLOR-CON) 10 mEq tablet   Yes No   Sig: Take 10 mEq by mouth daily   risperiDONE (RisperDAL) 0 5 mg tablet   Yes Yes   Sig: Take 0 5 mg by mouth daily at bedtime   umeclidinium bromide (INCRUSE ELLIPTA) 62 5 mcg/inh AEPB inhaler   Yes Yes   Sig: Inhale 1 puff daily      Facility-Administered Medications: None       Past Medical History:   Diagnosis Date    Anxiety     COPD (chronic obstructive pulmonary disease) (HCC)     Dementia     DJD (degenerative joint disease)     Gait disturbance     Hypertension     Insomnia     Major depression     Vitamin B12 deficiency     Vitamin D deficiency        History reviewed  No pertinent surgical history  History reviewed  No pertinent family history  I have reviewed and agree with the history as documented  Social History   Substance Use Topics    Smoking status: Unknown If Ever Smoked    Smokeless tobacco: Never Used    Alcohol use No        Review of Systems   HENT: Negative for rhinorrhea  Cardiovascular: Negative for chest pain  Gastrointestinal: Negative for abdominal pain  Neurological: Negative for headaches  All other systems reviewed and are negative  Physical Exam  Physical Exam   Constitutional: She is oriented to person, place, and time  She appears well-developed and well-nourished  HENT:   Head: Normocephalic and atraumatic  Right Ear: External ear normal    Left Ear: External ear normal    Eyes: Conjunctivae and EOM are normal    Neck: No JVD present  No tracheal deviation present  No thyromegaly present  Cardiovascular: Normal rate  Pulmonary/Chest: Effort normal and breath sounds normal  No stridor  Abdominal: Soft  She exhibits no distension and no mass  There is no tenderness  There is no guarding  No hernia  Musculoskeletal: Normal range of motion   She exhibits no edema, tenderness or deformity  Lymphadenopathy:     She has no cervical adenopathy  Neurological: She is alert and oriented to person, place, and time  Skin: Skin is warm  No rash noted  No erythema  No pallor  Psychiatric: She has a normal mood and affect  Her behavior is normal    Nursing note and vitals reviewed        Vital Signs  ED Triage Vitals   Temperature Pulse Respirations Blood Pressure SpO2   11/11/18 2141 11/11/18 2139 11/11/18 2139 11/11/18 2139 11/11/18 2139   98 2 °F (36 8 °C) 69 20 155/67 94 %      Temp Source Heart Rate Source Patient Position - Orthostatic VS BP Location FiO2 (%)   11/11/18 2141 -- 11/11/18 2139 11/11/18 2139 --   Oral  Lying Right arm       Pain Score       11/11/18 2139       1           Vitals:    11/11/18 2139   BP: 155/67   Pulse: 69   Patient Position - Orthostatic VS: Lying       Visual Acuity      ED Medications  Medications - No data to display    Diagnostic Studies  Results Reviewed     None                 CT hip left without contrast   Final Result by Hubert Jackson DO (11/11 2231)      No evidence of acute fracture         Workstation performed: [id number]                    Procedures  Procedures       Phone Contacts  ED Phone Contact    ED Course                               MDM  Number of Diagnoses or Management Options  Hip pain: new and requires workup     Amount and/or Complexity of Data Reviewed  Tests in the radiology section of CPT®: ordered and reviewed  Decide to obtain previous medical records or to obtain history from someone other than the patient: yes  Review and summarize past medical records: yes    Patient Progress  Patient progress: stable    CritCare Time    Disposition  Final diagnoses:   Hip pain     Time reflects when diagnosis was documented in both MDM as applicable and the Disposition within this note     Time User Action Codes Description Comment    11/11/2018 11:03 PM Gabo Gandara Add [M25 559] Hip pain       ED Disposition     ED Disposition Condition Comment    Discharge  David Lundberg discharge to home/self care  Condition at discharge: Stable        Follow-up Information     Follow up With Specialties Details [address], MD Geriatric Medicine   [place] [facility]  [phone]            Discharge Medication List as of 11/11/2018 11:03 PM      CONTINUE these medications which have NOT CHANGED    Details   acetaminophen (TYLENOL) 500 mg tablet Take 1 tablet (500 mg total) by mouth every 6 (six) hours as needed for mild pain, Starting Thu 11/8/2018, Print      albuterol (PROVENTIL HFA,VENTOLIN HFA) 90 mcg/act inhaler Inhale 2 puffs every 4 (four) hours as needed for wheezing, Historical Med      budesonide-formoterol (SYMBICORT) 160-4 5 mcg/act inhaler Inhale 2 puffs 2 (two) times a day Rinse mouth after use , Historical Med      Cholecalciferol (VITAMIN D3) 1000 units CAPS Take 1 capsule by mouth daily, Historical Med      citalopram (CeleXA) 20 mg tablet Take 10 mg by mouth daily, Historical Med      donepezil (ARICEPT) 10 mg tablet Take 10 mg by mouth daily at bedtime, Historical Med      risperiDONE (RisperDAL) 0 5 mg tablet Take 0 5 mg by mouth daily at bedtime, Historical Med      umeclidinium bromide (INCRUSE ELLIPTA) 62 5 mcg/inh AEPB inhaler Inhale 1 puff daily, Historical Med      furosemide (LASIX) 40 mg tablet Take 40 mg by mouth daily For 7 days for edema, Starting Fri 11/2/2018, Until Fri 11/9/2018, Historical Med      ibuprofen (MOTRIN) 600 mg tablet Take 1 tablet (600 mg total) by mouth every 6 (six) hours as needed for moderate pain, Starting Tue 11/6/2018, Print      potassium chloride (K-DUR,KLOR-CON) 10 mEq tablet Take 10 mEq by mouth daily, Historical Med           No discharge procedures on file      ED Provider  Electronically Signed by           Nora Echols DO  11/13/18 1770

## 2018-11-12 NOTE — DISCHARGE INSTRUCTIONS
Arthralgia   WHAT YOU NEED TO KNOW:   Arthralgia is pain in one or more joints, with no inflammation  It may be short-term and get better within 6 to 8 weeks  Arthralgia can be an early sign of arthritis  Arthralgia may be caused by a medical condition, such as a hormone disorder or a tumor  It may also be caused by an infection or injury  DISCHARGE INSTRUCTIONS:   Medicines: The following medicines may  be ordered for you:  · Acetaminophen  decreases pain  Ask how much to take and how often to take it  Follow directions  Acetaminophen can cause liver damage if not taken correctly  · NSAIDs  decrease pain and prevent swelling  Ask your healthcare provider which medicine is right for you  Ask how much to take and when to take it  Take as directed  NSAIDs can cause stomach bleeding and kidney problems if not taken correctly  · Pain relief cream  decreases pain  Use this cream as directed  · Take your medicine as directed  Contact your healthcare provider if you think your medicine is not helping or if you have side effects  Tell him of her if you are allergic to any medicine  Keep a list of the medicines, vitamins, and herbs you take  Include the amounts, and when and why you take them  Bring the list or the pill bottles to follow-up visits  Carry your medicine list with you in case of an emergency  Follow up with your healthcare provider or specialist as directed:  Write down your questions so you remember to ask them during your visits  Self-care:   · Apply heat  to help decrease pain  Use a heating pad or heat wrap  Apply heat for 20 to 30 minutes every 2 hours for as many days as directed  · Rest  as much as possible  Avoid activities that cause joint pain  · Apply ice  to help decrease swelling and pain  Ice may also help prevent tissue damage  Use an ice pack, or put crushed ice in a plastic bag   Cover it with a towel and place it on your painful joint for 15 to 20 minutes every hour or as directed  · Support  the joint with a brace or elastic wrap as directed  · Elevate  your joint above the level of your heart as often as you can to help decrease swelling and pain  Prop your painful joint on pillows or blankets to keep it elevated comfortably  · Lose weight  if you are overweight  Extra weight can put pressure on your joints and cause more pain  Ask your healthcare provider how much you should weigh  Ask him to help you create a weight loss plan  · Exercise  regularly to help improve joint movement and to decrease pain  Ask about the best exercise plan for you  Low-impact exercises can help take the pressure off your joints  Examples are walking, swimming, and water aerobics  Physical therapy:  A physical therapist teaches you exercises to help improve movement and strength, and to decrease pain  Ask your healthcare provider if physical therapy is right for you  Contact your healthcare provider or specialist if:   · You have a fever  · You continue to have joint pain that cannot be relieved with heat, ice, or medicine  · You have pain and inflammation around your joint  · You have questions or concerns about your condition or care  Return to the emergency department if:   · You have sudden, severe pain when you move your joint  · You have a fever and shaking chills  · You cannot move your joint  · You lose feeling on the side of your body where you have the painful joint  © 2017 [employer]  Information is for End User's use only and may not be sold, redistributed or otherwise used for commercial purposes  All illustrations and images included in CareNotes® are the copyrighted property of A D A M , Inc  or [employer]  The above information is an  only  It is not intended as medical advice for individual conditions or treatments   Talk to your doctor, nurse or pharmacist before following any medical regimen to see if it is safe and effective for you

## 2018-11-12 NOTE — ED NOTES
Pt soiled brief changed, lotion applied, sheets changed  Pt placed in position of comfort       Tae Mueller, OMEGA  11/11/18 0623

## 2018-11-12 NOTE — ED NOTES
Jerrald Severin would like a phone call for an update of pt's visit   Phone number is [phone]     Steve Garcia  11/12/18 0418

## 2018-11-17 ENCOUNTER — HOSPITAL ENCOUNTER (EMERGENCY)
Facility: HOSPITAL | Age: 78
Discharge: HOME | End: 2018-11-17
Attending: EMERGENCY MEDICINE
Payer: MEDICARE

## 2018-11-17 VITALS
OXYGEN SATURATION: 95 % | HEART RATE: 69 BPM | HEIGHT: 63 IN | BODY MASS INDEX: 35.44 KG/M2 | SYSTOLIC BLOOD PRESSURE: 133 MMHG | DIASTOLIC BLOOD PRESSURE: 63 MMHG | TEMPERATURE: 98.2 F | RESPIRATION RATE: 17 BRPM | WEIGHT: 200 LBS

## 2018-11-17 DIAGNOSIS — M25.552 CHRONIC LEFT HIP PAIN: Primary | ICD-10-CM

## 2018-11-17 DIAGNOSIS — G89.29 CHRONIC LEFT HIP PAIN: Primary | ICD-10-CM

## 2018-11-17 PROCEDURE — 99283 EMERGENCY DEPT VISIT LOW MDM: CPT

## 2018-11-17 NOTE — ED PROVIDER NOTES
History  Chief Complaint   Patient presents with    Back Pain     low back pain & L hip pain x 3weeks; no known injury     78-year-old female with left hip pain  Discrepancy of triage which included back pain however to me patient does not complain of back pain  She denies this  She is only complaining of left hip pain  This is a recurrent issue for her  She has been seen in the ER at 6 times, also seen other ERs for this pain  Daughter feels nothing is being done for her  However patient has had extensive workup including imaging, family physician has ordered outpatient imaging, CT scans including abdomen and pelvis all of which have been unremarkable  She was told this is likely musculoskeletal   Currently lives added an assisted living facility  It was recommended that she transition to a skilled nursing facility for further care and rehabilitation however daughter previously declined and said she is not ready for that  Denies any new symptoms no change in her symptoms  No change in severity  No injuries no falls since prior evaluation  History provided by:  Patient   used: No    Hip Pain   Location:  Left hip pain  Quality:  Aching pain  Severity:  Moderate  Onset quality:  Gradual  Duration:  4 weeks  Timing:  Intermittent  Progression:  Unchanged  Chronicity:  New  Context:  See hpi  Relieved by:  Rest  Worsened by: Movement  Ineffective treatments:  None tried  Associated symptoms: no abdominal pain, no chest pain, no congestion, no cough, no diarrhea, no ear pain, no fatigue, no fever, no headaches, no loss of consciousness, no myalgias, no nausea, no rash, no rhinorrhea, no shortness of breath, no sore throat, no vomiting and no wheezing        Prior to Admission Medications   Prescriptions Last Dose Informant Patient Reported? Taking?    Cholecalciferol (VITAMIN D3) 1000 units CAPS   Yes No   Sig: Take 1 capsule by mouth daily   acetaminophen (TYLENOL) 500 mg tablet   No No   Sig: Take 1 tablet (500 mg total) by mouth every 6 (six) hours as needed for mild pain   albuterol (PROVENTIL HFA,VENTOLIN HFA) 90 mcg/act inhaler   Yes No   Sig: Inhale 2 puffs every 4 (four) hours as needed for wheezing   budesonide-formoterol (SYMBICORT) 160-4 5 mcg/act inhaler   Yes No   Sig: Inhale 2 puffs 2 (two) times a day Rinse mouth after use  citalopram (CeleXA) 20 mg tablet   Yes No   Sig: Take 10 mg by mouth daily   cyanocobalamin 1000 MCG tablet   Yes No   Sig: Take 100 mcg by mouth daily   donepezil (ARICEPT) 10 mg tablet   Yes No   Sig: Take 10 mg by mouth daily at bedtime   donepezil (ARICEPT) 10 mg tablet   Yes No   Sig: Take 10 mg by mouth daily at bedtime   furosemide (LASIX) 40 mg tablet   Yes No   Sig: Take 40 mg by mouth daily For 7 days for edema   ibuprofen (MOTRIN) 600 mg tablet   No No   Sig: Take 1 tablet (600 mg total) by mouth every 6 (six) hours as needed for moderate pain   potassium chloride (K-DUR,KLOR-CON) 10 mEq tablet   Yes No   Sig: Take 10 mEq by mouth daily   risperiDONE (RisperDAL) 0 5 mg tablet   Yes No   Sig: Take 0 5 mg by mouth daily at bedtime   umeclidinium bromide (INCRUSE ELLIPTA) 62 5 mcg/inh AEPB inhaler   Yes No   Sig: Inhale 1 puff daily      Facility-Administered Medications: None       Past Medical History:   Diagnosis Date    Anxiety     COPD (chronic obstructive pulmonary disease) (HCC)     Dementia     DJD (degenerative joint disease)     Gait disturbance     Hypertension     Insomnia     Major depression     Vitamin B12 deficiency     Vitamin D deficiency        No past surgical history on file  No family history on file  I have reviewed and agree with the history as documented      Social History   Substance Use Topics    Smoking status: Unknown If Ever Smoked    Smokeless tobacco: Never Used    Alcohol use No        Review of Systems   Constitutional: Negative for activity change, appetite change, chills, diaphoresis, fatigue, fever and unexpected weight change  HENT: Negative for congestion, ear pain, rhinorrhea, sinus pressure, sore throat and trouble swallowing  Eyes: Negative for photophobia and visual disturbance  Respiratory: Negative for apnea, cough, choking, chest tightness, shortness of breath, wheezing and stridor  Cardiovascular: Negative for chest pain, palpitations and leg swelling  Gastrointestinal: Negative for abdominal distention, abdominal pain, blood in stool, constipation, diarrhea, nausea and vomiting  Genitourinary: Negative for decreased urine volume, difficulty urinating, dysuria, enuresis, flank pain, frequency, hematuria and urgency  Musculoskeletal: Negative for arthralgias, myalgias, neck pain and neck stiffness  Left hip pain   Skin: Negative for color change, pallor, rash and wound  Allergic/Immunologic: Negative  Neurological: Negative for dizziness, tremors, loss of consciousness, syncope, weakness, light-headedness, numbness and headaches  Hematological: Negative  Psychiatric/Behavioral: Negative  All other systems reviewed and are negative  Physical Exam  Physical Exam   Constitutional: She is oriented to person, place, and time  She appears well-developed and well-nourished  Non-toxic appearance  She does not have a sickly appearance  She does not appear ill  No distress  HENT:   Head: Normocephalic and atraumatic  Eyes: Pupils are equal, round, and reactive to light  EOM and lids are normal    Neck: Normal range of motion  Neck supple  Cardiovascular: Normal rate, regular rhythm, S1 normal, S2 normal, normal heart sounds, intact distal pulses and normal pulses  Exam reveals no gallop, no distant heart sounds, no friction rub and no decreased pulses  No murmur heard  Pulses:       Radial pulses are 2+ on the right side, and 2+ on the left side  Pulmonary/Chest: Effort normal and breath sounds normal  No accessory muscle usage   No apnea, no tachypnea and no bradypnea  No respiratory distress  She has no decreased breath sounds  She has no wheezes  She has no rhonchi  She has no rales  Abdominal: Soft  Normal appearance  She exhibits no distension  There is no tenderness  There is no rigidity, no rebound and no guarding  Musculoskeletal: She exhibits no edema or deformity  Left hip: She exhibits tenderness and bony tenderness  She exhibits normal range of motion and normal strength  Legs:  Neurological: She is alert and oriented to person, place, and time  No cranial nerve deficit  GCS eye subscore is 4  GCS verbal subscore is 5  GCS motor subscore is 6  Skin: Skin is warm, dry and intact  No rash noted  She is not diaphoretic  No erythema  No pallor  Psychiatric: Her speech is normal    Nursing note and vitals reviewed        Vital Signs  ED Triage Vitals   Temperature Pulse Respirations Blood Pressure SpO2   11/17/18 1405 11/17/18 1405 11/17/18 1405 11/17/18 1405 11/17/18 1405   98 2 °F (36 8 °C) 68 20 133/63 97 %      Temp Source Heart Rate Source Patient Position - Orthostatic VS BP Location FiO2 (%)   11/17/18 1405 11/17/18 1728 11/17/18 1405 11/17/18 1405 --   Oral Monitor Sitting Right arm       Pain Score       --                  Vitals:    11/17/18 1500 11/17/18 1728 11/17/18 1925 11/17/18 2000   BP:  130/60 125/87 133/63   Pulse: 68 70 65 69   Patient Position - Orthostatic VS:   Lying Lying       Visual Acuity  Visual Acuity      Most Recent Value   L Pupil Size (mm)  3   R Pupil Size (mm)  3          ED Medications  Medications - No data to display    Diagnostic Studies  Results Reviewed     None                 No orders to display              Procedures  Procedures       Phone Contacts  ED Phone Contact    ED Course                               MDM  Number of Diagnoses or Management Options  Chronic left hip pain: new and requires workup  Diagnosis management comments: Differential diagnosis including but not limited to: sprain, strain, fracture, dislocation, contusion  Risk of Complications, Morbidity, and/or Mortality  Presenting problems: low  Management options: low  General comments:  Plan:  I had a lengthy discussion with the daughter regards to treatment plan  She has already had extensive imaging, I do not feel additional imaging is warranted at this time  I discussed placement with the daughter which she was initially in agreement to  However once I informed daughter that she will need to be admitted to the hospital in to do so she will need an IV patient adamantly refused  Patient is clearly competent, awake alert and oriented  She understands that a skilled nursing facility and physical therapy could help her conditioning however if it requires her having IV to be admitted she is adamant that she does not want to stay  Explained that alternatively she can have her family physician arrange for a transition to skilled nursing facility, in the meantime she can continue with her assisted living facility  Again I offered admission to the patient with her daughter present and they have now declined, they will address skilled nursing facility with her family physician  Return parameters provided  Pt understands and agrees with plan  Patient Progress  Patient progress: stable    CritCare Time    Disposition  Final diagnoses:   Chronic left hip pain     Time reflects when diagnosis was documented in both MDM as applicable and the Disposition within this note     Time User Action Codes Description Comment    11/17/2018  6:38 PM Cristela Tyler Add [K29 114, C89 29] Chronic left hip pain       ED Disposition     ED Disposition Condition Comment    Discharge  Abhay Gentleman discharge to home/self care      Condition at discharge: Good        Follow-up Information     Follow up With Specialties Details [address], MD Geriatric Medicine Call for further evaluation [place] [id number]  [phone]            Discharge Medication List as of 11/17/2018  8:36 PM      CONTINUE these medications which have NOT CHANGED    Details   acetaminophen (TYLENOL) 500 mg tablet Take 1 tablet (500 mg total) by mouth every 6 (six) hours as needed for mild pain, Starting u 11/8/2018, Print      albuterol (PROVENTIL HFA,VENTOLIN HFA) 90 mcg/act inhaler Inhale 2 puffs every 4 (four) hours as needed for wheezing, Historical Med      budesonide-formoterol (SYMBICORT) 160-4 5 mcg/act inhaler Inhale 2 puffs 2 (two) times a day Rinse mouth after use , Historical Med      Cholecalciferol (VITAMIN D3) 1000 units CAPS Take 1 capsule by mouth daily, Historical Med      citalopram (CeleXA) 20 mg tablet Take 10 mg by mouth daily, Historical Med      cyanocobalamin 1000 MCG tablet Take 100 mcg by mouth daily, Historical Med      !! donepezil (ARICEPT) 10 mg tablet Take 10 mg by mouth daily at bedtime, Historical Med      !! donepezil (ARICEPT) 10 mg tablet Take 10 mg by mouth daily at bedtime, Historical Med      furosemide (LASIX) 40 mg tablet Take 40 mg by mouth daily For 7 days for edema, Starting Fri 11/2/2018, Until Fri 11/9/2018, Historical Med      ibuprofen (MOTRIN) 600 mg tablet Take 1 tablet (600 mg total) by mouth every 6 (six) hours as needed for moderate pain, Starting Tue 11/6/2018, Print      potassium chloride (K-DUR,KLOR-CON) 10 mEq tablet Take 10 mEq by mouth daily, Historical Med      risperiDONE (RisperDAL) 0 5 mg tablet Take 0 5 mg by mouth daily at bedtime, Historical Med      umeclidinium bromide (INCRUSE ELLIPTA) 62 5 mcg/inh AEPB inhaler Inhale 1 puff daily, Historical Med       !! - Potential duplicate medications found  Please discuss with provider  No discharge procedures on file      ED Provider  Electronically Signed by           Lalo Steele PA-C  11/18/18 5963

## 2018-11-17 NOTE — ED NOTES
Patient refusing IV, moving arm away from this nurse, states she wants to go home  Zoey Darling PA-C made aware and at bedside              Brandi Payton RN  11/17/18 7866

## 2018-11-17 NOTE — ED NOTES
Attempted to ambulate Pt with walker  Pt unable to stand  Provider made aware       April AISHWARYA Balderas  11/17/18 9542

## 2018-11-17 NOTE — DISCHARGE INSTRUCTIONS
Hip Pain   WHAT YOU NEED TO KNOW:   Hip pain can be caused by a number of conditions, such as bursitis, arthritis, or muscle or tendon strain  X-rays do not show broken bones  You may have swelling in the fluid-filled sacs that protect your muscles and tendons  Hip pain can also be caused by a lower back problem  Hip pain may be caused by trauma, playing sports, or running  Your pain may start in your hip and go to your thigh, buttock, or groin  DISCHARGE INSTRUCTIONS:   Medicines:   · NSAIDs , such as ibuprofen, help decrease swelling, pain, and fever  This medicine is available with or without a doctor's order  NSAIDs can cause stomach bleeding or kidney problems in certain people  If you take blood thinner medicine, always ask your healthcare provider if NSAIDs are safe for you  Always read the medicine label and follow directions  · Take your medicine as directed  Contact your healthcare provider if you think your medicine is not helping or if you have side effects  Tell him of her if you are allergic to any medicine  Keep a list of the medicines, vitamins, and herbs you take  Include the amounts, and when and why you take them  Bring the list or the pill bottles to follow-up visits  Carry your medicine list with you in case of an emergency  Return to the emergency department if:   · Your pain gets worse  · You have numbness in your leg or toes  · You cannot put any weight on or move your hip  Contact your healthcare provider if:   · You have a fever  · Your pain does not decrease, even after treatment  · You have questions or concerns about your condition or care  Follow up with your healthcare provider as directed: You may need physical therapy, an injection, or more testing  You may need to see an orthopedic specialist  Write down your questions so you remember to ask them during your visits    Manage your hip pain:   · Rest  your injured hip so that it can heal  You may need to avoid putting any weight on your hip for at least 48 hours  Return to normal activities as directed  · Ice  the injury for 20 minutes every 4 hours, or as directed  Use an ice pack, or put crushed ice in a plastic bag  Cover it with a towel to protect your skin  Ice helps prevent tissue damage and decreases swelling and pain  · Elevate  your injured hip above the level of your heart as often as you can  This will help decrease swelling and pain  If possible, prop your hip and leg on pillows or blankets to keep the area elevated comfortably  · Maintain a healthy weight  Extra body weight can cause pressure and pain in your hip, knee, and ankle joints  Ask your healthcare provider how much you should weigh  Ask him to help you create a weight loss plan if you are overweight  · Use assistive devices as directed  You may need to use a cane or crutches  Assistive devices help decrease pain and pressure on your hip when you walk  Ask your healthcare provider for more information about assistive devices and how to use them correctly  © 2017 [facility] Information is for End User's use only and may not be sold, redistributed or otherwise used for commercial purposes  All illustrations and images included in CareNotes® are the copyrighted property of A D A M , Inc  or [employer]  The above information is an  only  It is not intended as medical advice for individual conditions or treatments  Talk to your doctor, nurse or pharmacist before following any medical regimen to see if it is safe and effective for you

## 2018-11-18 NOTE — ED NOTES
Joe from [facility] reports  time for patient at 2030       Monae Olivares RN  11/17/18 7884

## 2018-11-18 NOTE — ED NOTES
Discharge instructions reviewed with patient and transport team      Alycia Garcia RN  11/17/18 2038

## 2018-11-18 NOTE — ED NOTES
Transport called for patient back to [facility] personal care home       Deena Bartlett RN  11/17/18 7905

## 2018-11-25 ENCOUNTER — APPOINTMENT (EMERGENCY)
Dept: RADIOLOGY | Facility: HOSPITAL | Age: 78
End: 2018-11-25
Payer: MEDICARE

## 2018-11-25 ENCOUNTER — HOSPITAL ENCOUNTER (EMERGENCY)
Facility: HOSPITAL | Age: 78
Discharge: HOME | End: 2018-11-25
Attending: EMERGENCY MEDICINE
Payer: MEDICARE

## 2018-11-25 VITALS
SYSTOLIC BLOOD PRESSURE: 133 MMHG | RESPIRATION RATE: 18 BRPM | WEIGHT: 199.96 LBS | HEIGHT: 63 IN | OXYGEN SATURATION: 97 % | HEART RATE: 72 BPM | TEMPERATURE: 98.6 F | BODY MASS INDEX: 35.43 KG/M2 | DIASTOLIC BLOOD PRESSURE: 58 MMHG

## 2018-11-25 DIAGNOSIS — M25.572 LEFT ANKLE PAIN, UNSPECIFIED CHRONICITY: ICD-10-CM
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
## 2018-11-25 PROCEDURE — 99284 EMERGENCY DEPT VISIT MOD MDM: CPT

## 2018-11-25 PROCEDURE — 73610 X-RAY EXAM OF ANKLE: CPT

## 2018-11-25 PROCEDURE — 73590 X-RAY EXAM OF LOWER LEG: CPT

## 2018-11-25 NOTE — ED PROVIDER NOTES
History  Chief Complaint   Patient presents with    Fall     42-year-old female with history of chronic degenerative joint disease, COPD, CHF, hypertension, anxiety and dementia presents status post fall  Per patient's personal care home patient tripped causing pain to the left ankle and lower extremity  Patient with chronic left hip pain  Patient has been evaluated multiple times for this hip pain and had multiple evaluations including CT imaging which have been negative for acute pathology  Patient otherwise with no head contusion, no LOC incontinence and no neck or back pain  History provided by:  Patient   used: No        Prior to Admission Medications   Prescriptions Last Dose Informant Patient Reported? Taking? Cholecalciferol (VITAMIN D3) 1000 units CAPS   Yes No   Sig: Take 1 capsule by mouth daily   acetaminophen (TYLENOL) 500 mg tablet   No No   Sig: Take 1 tablet (500 mg total) by mouth every 6 (six) hours as needed for mild pain   albuterol (PROVENTIL HFA,VENTOLIN HFA) 90 mcg/act inhaler   Yes No   Sig: Inhale 2 puffs every 4 (four) hours as needed for wheezing   budesonide-formoterol (SYMBICORT) 160-4 5 mcg/act inhaler   Yes No   Sig: Inhale 2 puffs 2 (two) times a day Rinse mouth after use     citalopram (CeleXA) 20 mg tablet   Yes No   Sig: Take 10 mg by mouth daily   cyanocobalamin 1000 MCG tablet   Yes No   Sig: Take 100 mcg by mouth daily   donepezil (ARICEPT) 10 mg tablet   Yes No   Sig: Take 10 mg by mouth daily at bedtime   donepezil (ARICEPT) 10 mg tablet   Yes No   Sig: Take 10 mg by mouth daily at bedtime   furosemide (LASIX) 40 mg tablet   Yes No   Sig: Take 40 mg by mouth daily For 7 days for edema   ibuprofen (MOTRIN) 600 mg tablet   No No   Sig: Take 1 tablet (600 mg total) by mouth every 6 (six) hours as needed for moderate pain   potassium chloride (K-DUR,KLOR-CON) 10 mEq tablet   Yes No   Sig: Take 10 mEq by mouth daily   risperiDONE (RisperDAL) 0 5 mg tablet   Yes No   Sig: Take 0 5 mg by mouth daily at bedtime   umeclidinium bromide (INCRUSE ELLIPTA) 62 5 mcg/inh AEPB inhaler   Yes No   Sig: Inhale 1 puff daily      Facility-Administered Medications: None       Past Medical History:   Diagnosis Date    Anxiety     COPD (chronic obstructive pulmonary disease) (HCC)     Dementia     DJD (degenerative joint disease)     Gait disturbance     Hypertension     Insomnia     Major depression     Vitamin B12 deficiency     Vitamin D deficiency        History reviewed  No pertinent surgical history  History reviewed  No pertinent family history  I have reviewed and agree with the history as documented  Social History   Substance Use Topics    Smoking status: Unknown If Ever Smoked    Smokeless tobacco: Never Used    Alcohol use No        Review of Systems   Constitutional: Negative for chills and fever  HENT: Negative for rhinorrhea and sore throat  Eyes: Negative for visual disturbance  Respiratory: Negative for cough and shortness of breath  Cardiovascular: Negative for chest pain and leg swelling  Gastrointestinal: Negative for abdominal pain, diarrhea, nausea and vomiting  Genitourinary: Negative for dysuria  Musculoskeletal: Positive for gait problem  Negative for back pain and myalgias  Ankle pain   Skin: Negative for rash  Neurological: Negative for dizziness and headaches  Psychiatric/Behavioral: Negative for confusion  All other systems reviewed and are negative  Physical Exam  Physical Exam   Constitutional: She is oriented to person, place, and time  She appears well-developed and well-nourished  HENT:   Nose: Nose normal    Mouth/Throat: Oropharynx is clear and moist  No oropharyngeal exudate  Eyes: Pupils are equal, round, and reactive to light  Conjunctivae and EOM are normal  No scleral icterus  Neck: Normal range of motion  Neck supple  No JVD present  No tracheal deviation present  Cardiovascular: Normal rate, regular rhythm and normal heart sounds  No murmur heard  Pulmonary/Chest: Effort normal and breath sounds normal  No respiratory distress  She has no wheezes  She has no rales  Abdominal: Soft  Bowel sounds are normal  There is no tenderness  There is no guarding  Musculoskeletal: Normal range of motion  She exhibits no edema or tenderness  Neurological: She is alert and oriented to person, place, and time  No cranial nerve deficit or sensory deficit  She exhibits normal muscle tone  5/5 motor, nl sens   Skin: Skin is warm and dry  Psychiatric: She has a normal mood and affect  Her behavior is normal    Nursing note and vitals reviewed  Vital Signs  ED Triage Vitals   Temperature Pulse Respirations Blood Pressure SpO2   11/25/18 1423 11/25/18 1423 11/25/18 1423 11/25/18 1423 11/25/18 1423   98 6 °F (37 °C) 72 18 139/56 98 %      Temp Source Heart Rate Source Patient Position - Orthostatic VS BP Location FiO2 (%)   11/25/18 1423 11/25/18 1423 11/25/18 1607 11/25/18 1423 --   Tympanic Monitor Lying Right arm       Pain Score       --                  Vitals:    11/25/18 1423 11/25/18 1607   BP: 139/56 133/58   Pulse: 72 72   Patient Position - Orthostatic VS:  Lying       Visual Acuity      ED Medications  Medications - No data to display    Diagnostic Studies  Results Reviewed     None                 XR tibia fibula 2 views LEFT   Final Result by Opal Mo (11/25 1538)   No acute osseous abnormality  Signed by Opal Mo MD      XR ankle 3+ views LEFT   Final Result by Opal Mo (11/25 1536)   No acute osseous abnormality  Signed by Oapl Mo MD                 Procedures  Procedures       Phone Contacts  ED Phone Contact    ED Course  ED Course as of Nov 25 1717   Sun Nov 25, 2018   1447 Patient seen examined at bedside  Imaging ordered  1547 Imaging reviewed no acute fracture or dislocation noted      1547 Discussed with patient discharge plan and instructions  Patient to follow up with primary care physician as an outpatient  Patient to return to ED if any change or worsening condition: increased pain, new onset fever or bleeding  [facility]  CritCare Time    Disposition  Final diagnoses:   Fall, initial encounter   Left ankle pain, unspecified chronicity     Time reflects when diagnosis was documented in both MDM as applicable and the Disposition within this note     Time User Action Codes Description Comment    11/25/2018  3:48 PM Mee Nagy Add [W19  DEAE] Fall, initial encounter     11/25/2018  3:48 PM Chayito Bishop Add [M25 [id number]] Left ankle pain, unspecified chronicity       ED Disposition     ED Disposition Condition Comment    Discharge  Shantel Marilee discharge to home/self care      Condition at discharge: Stable        Follow-up Information     Follow up With Specialties Details [address], MD Geriatric Medicine In 2 days  [address] [facility] [address] [phone]      [facility] Emergency Medicine  As needed, If symptoms worsen [address] [facility]  [phone]          Discharge Medication List as of 11/25/2018  3:48 PM      CONTINUE these medications which have NOT CHANGED    Details   acetaminophen (TYLENOL) 500 mg tablet Take 1 tablet (500 mg total) by mouth every 6 (six) hours as needed for mild pain, Starting Thu 11/8/2018, Print      albuterol (PROVENTIL HFA,VENTOLIN HFA) 90 mcg/act inhaler Inhale 2 puffs every 4 (four) hours as needed for wheezing, Historical Med      budesonide-formoterol (SYMBICORT) 160-4 5 mcg/act inhaler Inhale 2 puffs 2 (two) times a day Rinse mouth after use , Historical Med      Cholecalciferol (VITAMIN D3) 1000 units CAPS Take 1 capsule by mouth daily, Historical Med      citalopram (CeleXA) 20 mg tablet Take 10 mg by mouth daily, Historical Med      cyanocobalamin 1000 MCG tablet Take 100 mcg by mouth daily, Historical Med      !! donepezil (ARICEPT) 10 mg tablet Take 10 mg by mouth daily at bedtime, Historical Med      !! donepezil (ARICEPT) 10 mg tablet Take 10 mg by mouth daily at bedtime, Historical Med      furosemide (LASIX) 40 mg tablet Take 40 mg by mouth daily For 7 days for edema, Starting Fri 11/2/2018, Until Fri 11/9/2018, Historical Med      ibuprofen (MOTRIN) 600 mg tablet Take 1 tablet (600 mg total) by mouth every 6 (six) hours as needed for moderate pain, Starting Tue 11/6/2018, Print      potassium chloride (K-DUR,KLOR-CON) 10 mEq tablet Take 10 mEq by mouth daily, Historical Med      risperiDONE (RisperDAL) 0 5 mg tablet Take 0 5 mg by mouth daily at bedtime, Historical Med      umeclidinium bromide (INCRUSE ELLIPTA) 62 5 mcg/inh AEPB inhaler Inhale 1 puff daily, Historical Med       !! - Potential duplicate medications found  Please discuss with provider  No discharge procedures on file      ED Provider  Electronically Signed by           Elieser Salas DO  11/25/18 4993

## 2018-12-19 ENCOUNTER — HOSPITAL ENCOUNTER (INPATIENT)
Facility: HOSPITAL | Age: 78
LOS: 9 days | Discharge: NURSING, REHAB OR LONG-TERM CARE | DRG: 193 | End: 2018-12-28
Attending: EMERGENCY MEDICINE | Admitting: INTERNAL MEDICINE
Payer: MEDICARE

## 2018-12-19 ENCOUNTER — APPOINTMENT (EMERGENCY)
Dept: CT IMAGING | Facility: HOSPITAL | Age: 78
DRG: 193 | End: 2018-12-19
Payer: MEDICARE

## 2018-12-19 ENCOUNTER — APPOINTMENT (EMERGENCY)
Dept: RADIOLOGY | Facility: HOSPITAL | Age: 78
DRG: 193 | End: 2018-12-19
Payer: MEDICARE

## 2018-12-19 DIAGNOSIS — J18.9 PNEUMONIA: Primary | ICD-10-CM

## 2018-12-19 DIAGNOSIS — F32.A DEPRESSION: ICD-10-CM

## 2018-12-19 PROBLEM — F03.90 DEMENTIA (HCC): Chronic | Status: ACTIVE | Noted: 2018-12-19

## 2018-12-19 PROBLEM — R53.1 GENERALIZED WEAKNESS: Status: ACTIVE | Noted: 2018-12-19

## 2018-12-19 PROBLEM — G93.40 ENCEPHALOPATHY: Status: ACTIVE | Noted: 2018-12-19

## 2018-12-19 PROBLEM — J44.9 COPD (CHRONIC OBSTRUCTIVE PULMONARY DISEASE) (HCC): Chronic | Status: ACTIVE | Noted: 2018-12-19

## 2018-12-19 LAB
ALBUMIN SERPL BCP-MCNC: 2.9 G/DL (ref 3.5–5)
ALP SERPL-CCNC: 86 U/L (ref 46–116)
ALT SERPL W P-5'-P-CCNC: 18 U/L (ref 12–78)
ANION GAP SERPL CALCULATED.3IONS-SCNC: 9 MMOL/L (ref 4–13)
AST SERPL W P-5'-P-CCNC: 13 U/L (ref 5–45)
BASOPHILS # BLD AUTO: 0.02 THOUSANDS/ΜL (ref 0–0.1)
BASOPHILS NFR BLD AUTO: 0 % (ref 0–1)
BILIRUB SERPL-MCNC: 0.8 MG/DL (ref 0.2–1)
BILIRUB UR QL STRIP: ABNORMAL
BUN SERPL-MCNC: 13 MG/DL (ref 5–25)
CALCIUM SERPL-MCNC: 8.7 MG/DL (ref 8.3–10.1)
CHLORIDE SERPL-SCNC: 104 MMOL/L (ref 100–108)
CLARITY UR: CLEAR
CO2 SERPL-SCNC: 30 MMOL/L (ref 21–32)
COLOR UR: YELLOW
CREAT SERPL-MCNC: 0.97 MG/DL (ref 0.6–1.3)
EOSINOPHIL # BLD AUTO: 0 THOUSAND/ΜL (ref 0–0.61)
EOSINOPHIL NFR BLD AUTO: 0 % (ref 0–6)
ERYTHROCYTE [DISTWIDTH] IN BLOOD BY AUTOMATED COUNT: 14.8 % (ref 11.6–15.1)
GFR SERPL CREATININE-BSD FRML MDRD: 55 ML/MIN/1.73SQ M
GLUCOSE SERPL-MCNC: 131 MG/DL (ref 65–140)
GLUCOSE UR STRIP-MCNC: NEGATIVE MG/DL
HCT VFR BLD AUTO: 39.6 % (ref 34.8–46.1)
HGB BLD-MCNC: 12.3 G/DL (ref 11.5–15.4)
HGB UR QL STRIP.AUTO: NEGATIVE
IMM GRANULOCYTES # BLD AUTO: 0.1 THOUSAND/UL (ref 0–0.2)
IMM GRANULOCYTES NFR BLD AUTO: 1 % (ref 0–2)
KETONES UR STRIP-MCNC: ABNORMAL MG/DL
L PNEUMO1 AG UR QL IA.RAPID: NEGATIVE
LACTATE SERPL-SCNC: 1 MMOL/L (ref 0.5–2)
LEUKOCYTE ESTERASE UR QL STRIP: NEGATIVE
LIPASE SERPL-CCNC: 60 U/L (ref 73–393)
LYMPHOCYTES # BLD AUTO: 0.35 THOUSANDS/ΜL (ref 0.6–4.47)
LYMPHOCYTES NFR BLD AUTO: 3 % (ref 14–44)
MCH RBC QN AUTO: 28.9 PG (ref 26.8–34.3)
MCHC RBC AUTO-ENTMCNC: 31.1 G/DL (ref 31.4–37.4)
MCV RBC AUTO: 93 FL (ref 82–98)
MONOCYTES # BLD AUTO: 0.7 THOUSAND/ΜL (ref 0.17–1.22)
MONOCYTES NFR BLD AUTO: 5 % (ref 4–12)
NEUTROPHILS # BLD AUTO: 13.07 THOUSANDS/ΜL (ref 1.85–7.62)
NEUTS SEG NFR BLD AUTO: 91 % (ref 43–75)
NITRITE UR QL STRIP: NEGATIVE
NRBC BLD AUTO-RTO: 0 /100 WBCS
NT-PROBNP SERPL-MCNC: 689 PG/ML
PH UR STRIP.AUTO: 7 [PH] (ref 4.5–8)
PLATELET # BLD AUTO: 143 THOUSANDS/UL (ref 149–390)
PLATELET # BLD AUTO: 166 THOUSANDS/UL (ref 149–390)
PMV BLD AUTO: 10.1 FL (ref 8.9–12.7)
PMV BLD AUTO: 10.2 FL (ref 8.9–12.7)
POTASSIUM SERPL-SCNC: 4.4 MMOL/L (ref 3.5–5.3)
PROCALCITONIN SERPL-MCNC: 1.83 NG/ML
PROT SERPL-MCNC: 6.7 G/DL (ref 6.4–8.2)
PROT UR STRIP-MCNC: NEGATIVE MG/DL
RBC # BLD AUTO: 4.26 MILLION/UL (ref 3.81–5.12)
S PNEUM AG UR QL: POSITIVE
SODIUM SERPL-SCNC: 143 MMOL/L (ref 136–145)
SP GR UR STRIP.AUTO: 1.02 (ref 1–1.03)
TROPONIN I SERPL-MCNC: 0.02 NG/ML
UROBILINOGEN UR QL STRIP.AUTO: 0.2 E.U./DL
WBC # BLD AUTO: 14.24 THOUSAND/UL (ref 4.31–10.16)

## 2018-12-19 PROCEDURE — 87147 CULTURE TYPE IMMUNOLOGIC: CPT | Performed by: PHYSICIAN ASSISTANT

## 2018-12-19 PROCEDURE — 70450 CT HEAD/BRAIN W/O DYE: CPT

## 2018-12-19 PROCEDURE — 87040 BLOOD CULTURE FOR BACTERIA: CPT | Performed by: PHYSICIAN ASSISTANT

## 2018-12-19 PROCEDURE — 94664 DEMO&/EVAL PT USE INHALER: CPT

## 2018-12-19 PROCEDURE — 84145 PROCALCITONIN (PCT): CPT | Performed by: PHYSICIAN ASSISTANT

## 2018-12-19 PROCEDURE — 83690 ASSAY OF LIPASE: CPT | Performed by: PHYSICIAN ASSISTANT

## 2018-12-19 PROCEDURE — 93005 ELECTROCARDIOGRAM TRACING: CPT

## 2018-12-19 PROCEDURE — 83605 ASSAY OF LACTIC ACID: CPT | Performed by: PHYSICIAN ASSISTANT

## 2018-12-19 PROCEDURE — 36415 COLL VENOUS BLD VENIPUNCTURE: CPT | Performed by: PHYSICIAN ASSISTANT

## 2018-12-19 PROCEDURE — 1123F ACP DISCUSS/DSCN MKR DOCD: CPT | Performed by: STUDENT IN AN ORGANIZED HEALTH CARE EDUCATION/TRAINING PROGRAM

## 2018-12-19 PROCEDURE — 71046 X-RAY EXAM CHEST 2 VIEWS: CPT

## 2018-12-19 PROCEDURE — 87081 CULTURE SCREEN ONLY: CPT | Performed by: PHYSICIAN ASSISTANT

## 2018-12-19 PROCEDURE — 84484 ASSAY OF TROPONIN QUANT: CPT | Performed by: PHYSICIAN ASSISTANT

## 2018-12-19 PROCEDURE — 83880 ASSAY OF NATRIURETIC PEPTIDE: CPT | Performed by: PHYSICIAN ASSISTANT

## 2018-12-19 PROCEDURE — 81003 URINALYSIS AUTO W/O SCOPE: CPT | Performed by: PHYSICIAN ASSISTANT

## 2018-12-19 PROCEDURE — 99223 1ST HOSP IP/OBS HIGH 75: CPT | Performed by: INTERNAL MEDICINE

## 2018-12-19 PROCEDURE — 85049 AUTOMATED PLATELET COUNT: CPT | Performed by: INTERNAL MEDICINE

## 2018-12-19 PROCEDURE — 80053 COMPREHEN METABOLIC PANEL: CPT | Performed by: PHYSICIAN ASSISTANT

## 2018-12-19 PROCEDURE — 87449 NOS EACH ORGANISM AG IA: CPT | Performed by: PHYSICIAN ASSISTANT

## 2018-12-19 PROCEDURE — 85025 COMPLETE CBC W/AUTO DIFF WBC: CPT | Performed by: PHYSICIAN ASSISTANT

## 2018-12-19 PROCEDURE — 87631 RESP VIRUS 3-5 TARGETS: CPT | Performed by: INTERNAL MEDICINE

## 2018-12-19 PROCEDURE — 99285 EMERGENCY DEPT VISIT HI MDM: CPT

## 2018-12-19 RX ORDER — CITALOPRAM 20 MG/1
10 TABLET ORAL DAILY
Status: DISCONTINUED | OUTPATIENT
Start: 2018-12-19 | End: 2018-12-28 | Stop reason: HOSPADM

## 2018-12-19 RX ORDER — ALBUTEROL SULFATE 90 UG/1
2 AEROSOL, METERED RESPIRATORY (INHALATION) EVERY 4 HOURS PRN
Status: DISCONTINUED | OUTPATIENT
Start: 2018-12-19 | End: 2018-12-28 | Stop reason: HOSPADM

## 2018-12-19 RX ORDER — SENNOSIDES 8.6 MG
1 TABLET ORAL
Status: DISCONTINUED | OUTPATIENT
Start: 2018-12-19 | End: 2018-12-28 | Stop reason: HOSPADM

## 2018-12-19 RX ORDER — RISPERIDONE 0.25 MG/1
0.5 TABLET, FILM COATED ORAL
Status: DISCONTINUED | OUTPATIENT
Start: 2018-12-19 | End: 2018-12-28 | Stop reason: HOSPADM

## 2018-12-19 RX ORDER — POTASSIUM CHLORIDE 750 MG/1
10 TABLET, EXTENDED RELEASE ORAL DAILY
Status: DISCONTINUED | OUTPATIENT
Start: 2018-12-19 | End: 2018-12-27

## 2018-12-19 RX ORDER — DONEPEZIL HYDROCHLORIDE 5 MG/1
10 TABLET, FILM COATED ORAL
Status: DISCONTINUED | OUTPATIENT
Start: 2018-12-19 | End: 2018-12-28 | Stop reason: HOSPADM

## 2018-12-19 RX ORDER — UBIDECARENONE 75 MG
100 CAPSULE ORAL DAILY
Status: DISCONTINUED | OUTPATIENT
Start: 2018-12-19 | End: 2018-12-28 | Stop reason: HOSPADM

## 2018-12-19 RX ORDER — ACETAMINOPHEN 325 MG/1
500 TABLET ORAL EVERY 6 HOURS PRN
Status: DISCONTINUED | OUTPATIENT
Start: 2018-12-19 | End: 2018-12-28 | Stop reason: HOSPADM

## 2018-12-19 RX ORDER — SODIUM CHLORIDE 9 MG/ML
75 INJECTION, SOLUTION INTRAVENOUS CONTINUOUS
Status: DISCONTINUED | OUTPATIENT
Start: 2018-12-19 | End: 2018-12-25

## 2018-12-19 RX ORDER — BUDESONIDE AND FORMOTEROL FUMARATE DIHYDRATE 160; 4.5 UG/1; UG/1
2 AEROSOL RESPIRATORY (INHALATION) 2 TIMES DAILY
Status: DISCONTINUED | OUTPATIENT
Start: 2018-12-19 | End: 2018-12-28 | Stop reason: HOSPADM

## 2018-12-19 RX ORDER — GUAIFENESIN 600 MG
600 TABLET, EXTENDED RELEASE 12 HR ORAL EVERY 12 HOURS SCHEDULED
Status: DISCONTINUED | OUTPATIENT
Start: 2018-12-19 | End: 2018-12-28 | Stop reason: HOSPADM

## 2018-12-19 RX ORDER — SODIUM CHLORIDE 9 MG/ML
75 INJECTION, SOLUTION INTRAVENOUS CONTINUOUS
Status: DISPENSED | OUTPATIENT
Start: 2018-12-19 | End: 2018-12-20

## 2018-12-19 RX ORDER — MELATONIN
1000 DAILY
Status: DISCONTINUED | OUTPATIENT
Start: 2018-12-19 | End: 2018-12-28 | Stop reason: HOSPADM

## 2018-12-19 RX ORDER — MIDAZOLAM HYDROCHLORIDE 1 MG/ML
1 INJECTION INTRAMUSCULAR; INTRAVENOUS ONCE
Status: DISCONTINUED | OUTPATIENT
Start: 2018-12-19 | End: 2018-12-19

## 2018-12-19 RX ORDER — ONDANSETRON 2 MG/ML
4 INJECTION INTRAMUSCULAR; INTRAVENOUS EVERY 6 HOURS PRN
Status: DISCONTINUED | OUTPATIENT
Start: 2018-12-19 | End: 2018-12-28 | Stop reason: HOSPADM

## 2018-12-19 RX ADMIN — DONEPEZIL HYDROCHLORIDE 10 MG: 5 TABLET ORAL at 21:07

## 2018-12-19 RX ADMIN — AZITHROMYCIN MONOHYDRATE 500 MG: 500 INJECTION, POWDER, LYOPHILIZED, FOR SOLUTION INTRAVENOUS at 14:51

## 2018-12-19 RX ADMIN — GUAIFENESIN 600 MG: 600 TABLET, EXTENDED RELEASE ORAL at 21:07

## 2018-12-19 RX ADMIN — CEFEPIME HYDROCHLORIDE 2000 MG: 2 INJECTION, POWDER, FOR SOLUTION INTRAVENOUS at 14:22

## 2018-12-19 RX ADMIN — SODIUM CHLORIDE 75 ML/HR: 0.9 INJECTION, SOLUTION INTRAVENOUS at 16:49

## 2018-12-19 RX ADMIN — VANCOMYCIN HYDROCHLORIDE 1250 MG: 5 INJECTION, POWDER, LYOPHILIZED, FOR SOLUTION INTRAVENOUS at 16:51

## 2018-12-19 RX ADMIN — SODIUM CHLORIDE 500 ML: 0.9 INJECTION, SOLUTION INTRAVENOUS at 14:26

## 2018-12-19 RX ADMIN — RISPERIDONE 0.5 MG: 0.25 TABLET ORAL at 21:07

## 2018-12-19 NOTE — ASSESSMENT & PLAN NOTE
· Came from a personal living facility  · Present on admission, with cough noted in the emergency room and reports of shortness of breath in the facility  Chest x-ray revealed pneumonia  · Pneumonia orders done  · Likely gram-negative bacteria  · In the emergency room, patient was given IV cefepime, IV vancomycin and IV azithromycin  · I will continue with IV cefepime  · Follow-up results of the blood cultures  · Will get sputum cultures  · Urine for Legionella and streptococcal antigens    · Mucinex

## 2018-12-19 NOTE — H&P
H&P- Gonzalez Jasso 1940, [de-identified] y o  female MRN: [id number]    Unit/Bed#: ED 16 Encounter: [id number]    Primary Care Provider: ODILIA Mac   Date and time admitted to hospital: 12/19/2018  9:20 AM        * HCAP (healthcare-associated pneumonia)   Assessment & Plan    · Came from a personal living facility  · Present on admission, with cough noted in the emergency room and reports of shortness of breath in the facility  Chest x-ray revealed pneumonia  · Pneumonia orders done  · Likely gram-negative bacteria  · In the emergency room, patient was given IV cefepime, IV vancomycin and IV azithromycin  · I will continue with IV cefepime  · Follow-up results of the blood cultures  · Will get sputum cultures  · Urine for Legionella and streptococcal antigens  · Mucinex     COPD (chronic obstructive pulmonary disease) (HCC)   Assessment & Plan    · No exacerbation at this point  · Continue patient's nebulizations/inhalers  · According to the sign out, patient is on chronic oxygen at 2 L  Thus will continue with this  Encephalopathy   Assessment & Plan    · According to the ER sign out and report, patient had some alteration mental status in the facility  · Likely disease resolved as patient is presently answering questions appropriately and relatively oriented  Patient likely back at her baseline  Patient has baseline dementia  · Likely disease due to patient's infection/pneumonia  · Observe  · CT scan of the head was negative  · For further workup management if this worsens significantly     Generalized weakness   Assessment & Plan    · PT/OT eval and treat     Dementia   Assessment & Plan    · Supportive care  VTE Prophylaxis: Enoxaparin (Lovenox)  / sequential compression device   Code Status:  DNR DNI  POLST: POLST form is not discussed and not completed at this time        Anticipated Length of Stay:  Patient will be admitted on an Inpatient basis with an anticipated length of stay of  greater than 2 midnights  Justification for Hospital Stay:  Above findings and plans  Total Time for Visit, including Counseling / Coordination of Care: 1 hour  Greater than 50% of this total time spent on direct patient counseling and coordination of care  Chief Complaint:   I am doing fine      History of Present Illness:    Tyesha Beaver is a [de-identified] y o  female who presents to the emergency room at [facility] [place] with according to the sign outs and report, patient was sent here from a personal care facility due to alteration mental status with confusion as well as shortness of breath with gurgling breath sounds noted  I was also told that patient was generally weak and had difficulty walking in the facility  However, from my interview with the patient, when asked why she was sent here, patient really does not know  In fact, patient denies being short of breath  Patient also denies that she was confused earlier today  Patient admits that she has baseline dry cough, but not worse lately  In the emergency room, during my interview and examination of her, she only cough 1 time, and cough possibly productive when I heard it  Patient denies any fever or chills or any other symptoms  Presently, patient can answer questions appropriately and can carry a conversation  Patient was oriented with person, place and knows her birthday  She admits that she already does not know the date and does not pay attention with present date  In the emergency room, patient was found to have healthcare associated pneumonia  IV antibiotics with cefepime, vancomycin and azithromycin were all given  Review of Systems:    Review of Systems     Ten point review systems done and they were negative except for the ones I mentioned in my HPI  Patient denies any headaches, dizziness, chest pains, nausea, vomiting, problems urinating or problems moving her bowels        Past Medical and Surgical History:     Past Medical History:   Diagnosis Date    Anxiety     COPD (chronic obstructive pulmonary disease) ([facility] [address] )     Dementia     DJD (degenerative joint disease)     Gait disturbance     Hypertension     Insomnia     Major depression     Vitamin B12 deficiency     Vitamin D deficiency        History reviewed  No pertinent surgical history  Meds/Allergies:    Prior to Admission medications    Medication Sig Start Date End Date Taking? Authorizing Provider   acetaminophen (TYLENOL) 500 mg tablet Take 1 tablet (500 mg total) by mouth every 6 (six) hours as needed for mild pain 11/8/18   Shyam Duval MD   albuterol (PROVENTIL HFA,VENTOLIN HFA) 90 mcg/act inhaler Inhale 2 puffs every 4 (four) hours as needed for wheezing    Historical Provider, MD   budesonide-formoterol (SYMBICORT) 160-4 5 mcg/act inhaler Inhale 2 puffs 2 (two) times a day Rinse mouth after use      Historical Provider, MD   Cholecalciferol (VITAMIN D3) 1000 units CAPS Take 1 capsule by mouth daily    Historical Provider, MD   citalopram (CeleXA) 20 mg tablet Take 10 mg by mouth daily    Historical Provider, MD   cyanocobalamin 1000 MCG tablet Take 100 mcg by mouth daily    Historical Provider, MD   donepezil (ARICEPT) 10 mg tablet Take 10 mg by mouth daily at bedtime    Historical Provider, MD   donepezil (ARICEPT) 10 mg tablet Take 10 mg by mouth daily at bedtime    Historical Provider, MD   furosemide (LASIX) 40 mg tablet Take 40 mg by mouth daily For 7 days for edema 11/2/18 11/9/18  Historical Provider, MD   ibuprofen (MOTRIN) 600 mg tablet Take 1 tablet (600 mg total) by mouth every 6 (six) hours as needed for moderate pain 11/6/18   Maryuri Kaur MD   potassium chloride (K-DUR,KLOR-CON) 10 mEq tablet Take 10 mEq by mouth daily    Historical Provider, MD   risperiDONE (RisperDAL) 0 5 mg tablet Take 0 5 mg by mouth daily at bedtime    Historical Provider, MD   umeclidinium bromide (INCRUSE ELLIPTA) 62 5 mcg/inh AEPB inhaler Inhale 1 puff daily    Historical Provider, MD     Medication list was reviewed    Allergies: Allergies   Allergen Reactions    Penicillins     Sulfa Antibiotics        Social History:     Marital Status:      History   Alcohol Use No     History   Smoking Status    Unknown If Ever Smoked   Smokeless Tobacco    Never Used     History   Drug Use No       Family History:    History reviewed  No pertinent family history  Physical Exam:     Vitals:   Blood Pressure: 107/53 (12/19/18 1427)  Pulse: 91 (12/19/18 1427)  Temperature: 98 7 °F (37 1 °C) (12/19/18 0923)  Temp Source: Oral (12/19/18 8076)  Respirations: 18 (12/19/18 1427)  Height: 5' 3" (160 cm) (12/19/18 2216)  Weight - Scale: 91 3 kg (201 lb 4 5 oz) (12/19/18 0923)  SpO2: 96 % (12/19/18 1427)    Physical Exam   Constitutional: No distress  HENT:   Head: Normocephalic and atraumatic  Mouth/Throat: No oropharyngeal exudate  Eyes: Conjunctivae are normal  Right eye exhibits no discharge  Left eye exhibits no discharge  No scleral icterus  Neck: No JVD present  No tracheal deviation present  Cardiovascular: Normal rate and normal heart sounds  Exam reveals no gallop and no friction rub  No murmur heard  Pulmonary/Chest: Effort normal  No stridor  No respiratory distress  She has no wheezes  She has no rales  Decreased breath sounds at the right base  Abdominal: Soft  She exhibits no distension  There is no tenderness  There is no rebound and no guarding  Musculoskeletal: She exhibits no edema, tenderness or deformity  Neurological: She is alert  She has normal strength  No cranial nerve deficit or sensory deficit  Patient answers questions appropriately  Patient can tell me her exact birth date, her age, where she is right now, and her complete name  However, she admitted that she does not pay attention with the date anymore, thus she cannot tell the date today  Skin: Skin is warm  No rash noted  She is not diaphoretic  No erythema  No pallor  Psychiatric: She has a normal mood and affect  Her behavior is normal  Thought content normal    Vitals reviewed  Additional Data:     Lab Results: I have personally reviewed pertinent reports  Results from last 7 days  Lab Units 12/19/18  1041   WBC Thousand/uL 14 24*   HEMOGLOBIN g/dL 12 3   HEMATOCRIT % 39 6   PLATELETS Thousands/uL 166   NEUTROS PCT % 91*   LYMPHS PCT % 3*   MONOS PCT % 5   EOS PCT % 0       Results from last 7 days  Lab Units 12/19/18  1041   SODIUM mmol/L 143   POTASSIUM mmol/L 4 4   CHLORIDE mmol/L 104   CO2 mmol/L 30   BUN mg/dL 13   CREATININE mg/dL 0 97   ANION GAP mmol/L 9   CALCIUM mg/dL 8 7   ALBUMIN g/dL 2 9*   TOTAL BILIRUBIN mg/dL 0 80   ALK PHOS U/L 86   ALT U/L 18   AST U/L 13   GLUCOSE RANDOM mg/dL 131                   Results from last 7 days  Lab Units 12/19/18  1419   LACTIC ACID mmol/L 1 0       Imaging: I have personally reviewed pertinent reports  XR chest 2 views   Final Result by Ryne Fuller MD (12/19 4788)      Increased right upper lobe alveolar opacity and right pleural effusion  Pneumonia suspected  Volume loss on the left apparently related to previous lobectomy  Correlate for previous surgical history  The study was marked in [facility] for immediate notification  Workstation performed: [id number]         CT head without contrast   Final Result by Ryne Fuller MD (12/19 5819)      No acute intracranial abnormality  Workstation performed: [id number]             EKG, Pathology, and Other Studies Reviewed on Admission:   · EKG:  Sinus tachycardia at 101 per minute with some artifacts  EKG was done in outside facility  Allscripts / Epic Records Reviewed: Yes     ** Please Note: This note has been constructed using a voice recognition system   **

## 2018-12-19 NOTE — ASSESSMENT & PLAN NOTE
· No exacerbation at this point  · Continue patient's nebulizations/inhalers  · According to the sign out, patient is on chronic oxygen at 2 L  Thus will continue with this

## 2018-12-19 NOTE — ED PROVIDER NOTES
History  Chief Complaint   Patient presents with    Weakness - Generalized     Pt comes in via EMS for evaluation for generalized weakness and altere mental status per staff at [facility] care home   Altered Mental Status     Raphael Rivera is a [de-identified] y o  female w PMH psych disease, HTN, COPD who presents for evaluation of weakness  Patient presents today from [facility]  Apparently they noticed this morning she had altered mental status, with shortness of breath and gurgling per EMS documentation  She does wear 2 L of oxygen at home for COPD  Patient provides a limited history as she reports nothing is wrong" and I feel fine    The only thing she does mention is left-sided knee pain that she reports began this morning  She denies any complaints whatsoever and also tells me she lives at home with her daughter in [facility][note 3]  Prior to Admission Medications   Prescriptions Last Dose Informant Patient Reported? Taking? Cholecalciferol (VITAMIN D3) 1000 units CAPS   Yes No   Sig: Take 1 capsule by mouth daily   acetaminophen (TYLENOL) 500 mg tablet   No No   Sig: Take 1 tablet (500 mg total) by mouth every 6 (six) hours as needed for mild pain   albuterol (PROVENTIL HFA,VENTOLIN HFA) 90 mcg/act inhaler   Yes No   Sig: Inhale 2 puffs every 4 (four) hours as needed for wheezing   budesonide-formoterol (SYMBICORT) 160-4 5 mcg/act inhaler   Yes No   Sig: Inhale 2 puffs 2 (two) times a day Rinse mouth after use     citalopram (CeleXA) 20 mg tablet   Yes No   Sig: Take 10 mg by mouth daily   cyanocobalamin 1000 MCG tablet   Yes No   Sig: Take 100 mcg by mouth daily   donepezil (ARICEPT) 10 mg tablet   Yes No   Sig: Take 10 mg by mouth daily at bedtime   donepezil (ARICEPT) 10 mg tablet   Yes No   Sig: Take 10 mg by mouth daily at bedtime   furosemide (LASIX) 40 mg tablet   Yes No   Sig: Take 40 mg by mouth daily For 7 days for edema   ibuprofen (MOTRIN) 600 mg tablet   No No   Sig: Take 1 tablet (600 mg total) by mouth every 6 (six) hours as needed for moderate pain   potassium chloride (K-DUR,KLOR-CON) 10 mEq tablet   Yes No   Sig: Take 10 mEq by mouth daily   risperiDONE (RisperDAL) 0 5 mg tablet   Yes No   Sig: Take 0 5 mg by mouth daily at bedtime   umeclidinium bromide (INCRUSE ELLIPTA) 62 5 mcg/inh AEPB inhaler   Yes No   Sig: Inhale 1 puff daily      Facility-Administered Medications: None       Past Medical History:   Diagnosis Date    Anxiety     COPD (chronic obstructive pulmonary disease) (HCC)     Dementia     DJD (degenerative joint disease)     Gait disturbance     Hypertension     Insomnia     Major depression     Vitamin B12 deficiency     Vitamin D deficiency        History reviewed  No pertinent surgical history  History reviewed  No pertinent family history  I have reviewed and agree with the history as documented  Social History   Substance Use Topics    Smoking status: Unknown If Ever Smoked    Smokeless tobacco: Never Used    Alcohol use No        Review of Systems   Unable to perform ROS: Mental status change       Physical Exam  Physical Exam   Constitutional: She is oriented to person, place, and time  She appears well-developed and well-nourished  No distress  HENT:   Head: Normocephalic and atraumatic  Eyes: Pupils are equal, round, and reactive to light  Neck: Normal range of motion  Neck supple  No tracheal deviation present  Cardiovascular: Normal rate, regular rhythm, normal heart sounds and intact distal pulses  Exam reveals no gallop and no friction rub  No murmur heard  Pulmonary/Chest: Effort normal and breath sounds normal  No respiratory distress  She has no wheezes  She has no rales  Patient has some scattered rhonchi  She is on her home 2 L of oxygen with sats in the low to mid 90s  Mildly tachypneic but very comfortable and is talking to me in normal sentences  Abdominal: Soft   Bowel sounds are normal  She exhibits no distension and no mass  There is no tenderness  There is no guarding  Musculoskeletal: She exhibits no edema or deformity  Left knee is normal, nv intact distally, no pain to palpation, no effusion  The knee has equal strength in comparison to the right side, we can but diffuse and movements are slow but this is diffuse  Neurological: She is alert and oriented to person, place, and time  Skin: Skin is warm and dry  She is not diaphoretic  Dry skin   Psychiatric: She has a normal mood and affect  Her behavior is normal    Nursing note and vitals reviewed        Vital Signs  ED Triage Vitals [12/19/18 0923]   Temperature Pulse Respirations Blood Pressure SpO2   98 7 °F (37 1 °C) 99 17 127/57 (!) 89 %      Temp Source Heart Rate Source Patient Position - Orthostatic VS BP Location FiO2 (%)   Oral Monitor Lying Right arm --      Pain Score       3           Vitals:    12/19/18 0923 12/19/18 1036 12/19/18 1121 12/19/18 1242   BP: 127/57 114/67 112/66 126/58   Pulse: 99 93 96 100   Patient Position - Orthostatic VS: Lying Lying Sitting Sitting       Visual Acuity      ED Medications  Medications   cefepime (MAXIPIME) 2,000 mg in dextrose 5 % 50 mL IVPB (not administered)   vancomycin (VANCOCIN) 1,250 mg in sodium chloride 0 9 % 250 mL IVPB (not administered)   azithromycin (ZITHROMAX) 500 mg in sodium chloride 0 9% 250mL IVPB 500 mg (not administered)   sodium chloride 0 9 % bolus 500 mL (not administered)       Diagnostic Studies  Results Reviewed     Procedure Component Value Units Date/Time    Lactic acid, plasma [[id number]]     Lab Status:  No result Specimen:  Blood     Blood culture #1 [[id number]]     Lab Status:  No result Specimen:  Blood     Blood culture #2 [[id number]]     Lab Status:  No result Specimen:  Blood     CBC and differential [[id number]]  (Abnormal) Collected:  12/19/18 1041    Lab Status:  Final result Specimen:  Blood from Arm, Right Updated:  12/19/18 1129     WBC 14 24 (H) Thousand/uL RBC 4 26 Million/uL      Hemoglobin 12 3 g/dL      Hematocrit 39 6 %      MCV 93 fL      MCH 28 9 pg      MCHC 31 1 (L) g/dL      RDW 14 8 %      MPV 10 1 fL      Platelets 482 Thousands/uL      nRBC 0 /100 WBCs      Neutrophils Relative 91 (H) %      Immat GRANS % 1 %      Lymphocytes Relative 3 (L) %      Monocytes Relative 5 %      Eosinophils Relative 0 %      Basophils Relative 0 %      Neutrophils Absolute 13 07 (H) Thousands/µL      Immature Grans Absolute 0 10 Thousand/uL      Lymphocytes Absolute 0 35 (L) Thousands/µL      Monocytes Absolute 0 70 Thousand/µL      Eosinophils Absolute 0 00 Thousand/µL      Basophils Absolute 0 02 Thousands/µL     UA w Reflex to Microscopic w Reflex to Culture [[id number]]  (Abnormal) Collected:  12/19/18 1119    Lab Status:  Final result Specimen:  Urine from Urine, Straight Cath Updated:  12/19/18 1129     Color, UA Yellow     Clarity, UA Clear     Specific Anita, UA 1 020     pH, UA 7 0     Leukocytes, UA Negative     Nitrite, UA Negative     Protein, UA Negative mg/dl      Glucose, UA Negative mg/dl      Ketones, UA 15 (1+) (A) mg/dl      Urobilinogen, UA 0 2 E U /dl      Bilirubin, UA Small (A)     Blood, UA Negative    Lipase [[id number]]  (Abnormal) Collected:  12/19/18 1041    Lab Status:  Final result Specimen:  Blood from Arm, Right Updated:  12/19/18 1117     Lipase 60 (L) u/L     NT-BNP PRO [[id number]]  (Abnormal) Collected:  12/19/18 1041    Lab Status:  Final result Specimen:  Blood from Arm, Right Updated:  12/19/18 1117     NT-proBNP 689 (H) pg/mL     Comprehensive metabolic panel [[id number]]  (Abnormal) Collected:  12/19/18 1041    Lab Status:  Final result Specimen:  Blood from Arm, Right Updated:  12/19/18 1110     Sodium 143 mmol/L      Potassium 4 4 mmol/L      Chloride 104 mmol/L      CO2 30 mmol/L      ANION GAP 9 mmol/L      BUN 13 mg/dL      Creatinine 0 97 mg/dL      Glucose 131 mg/dL      Calcium 8 7 mg/dL      AST 13 U/L      ALT 18 U/L Alkaline Phosphatase 86 U/L      Total Protein 6 7 g/dL      Albumin 2 9 (L) g/dL      Total Bilirubin 0 80 mg/dL      eGFR 55 ml/min/1 73sq m     Narrative:         National Kidney Disease Education Program recommendations are as follows:  GFR calculation is accurate only with a steady state creatinine  Chronic Kidney disease less than 60 ml/min/1 73 sq  meters  Kidney failure less than 15 ml/min/1 73 sq  meters  Troponin I [[id number]]  (Normal) Collected:  12/19/18 1041    Lab Status:  Final result Specimen:  Blood from Arm, Right Updated:  12/19/18 1110     Troponin I 0 02 ng/mL                  XR chest 2 views   Final Result by Sarah Beth Sandoval MD (12/19 1243)      Increased right upper lobe alveolar opacity and right pleural effusion  Pneumonia suspected  Volume loss on the left apparently related to previous lobectomy  Correlate for previous surgical history  The study was marked in [facility] for immediate notification  Workstation performed: [id number]         CT head without contrast   Final Result by Sarah Beth Sandoval MD (12/19 1104)      No acute intracranial abnormality  Workstation performed: [id number]                    Procedures  Procedures       Phone Contacts  ED Phone Contact    ED Course  ED Course as of Dec 19 1343   Wed Dec 19, 2018   1031 Called daughter for an update as patient reporting she lives w daughter  Pt reporting she doesn't want labs drawn here but she doesn't remember why she is here and does not remember where she lives or where she comes from  1314 Patient filled ambulate like she usually does at baseline with her walker  Her workup is significant for possible pneumonia  This coupled with leukocytosis, complaint of shortness of breath or nursing home and her weakness makes her candidate for inpatient admission                            Initial Sepsis Screening     Row Name 12/19/18 1340                Is the patient's history suggestive of a new or worsening infection? (!)  Yes (Proceed)  -SB        Suspected source of infection pneumonia  -SB        Are two or more of the following signs & symptoms of infection both present and new to the patient? (!)  Yes (Proceed)  -SB        Indicate SIRS criteria Tachypnea > 20 resp per min; Tachycardia > 90 bpm;Altered mental status;Leukocytosis (WBC > 42919 IJL)  -SB        If the answer is yes to both questions, suspicion of sepsis is present  --        If severe sepsis is present AND tissue hypoperfusion perists in the hour after fluid resuscitation or lactate > 4, the patient meets criteria for SEPTIC SHOCK  --        Are any of the following organ dysfunction criteria present within 6 hours of suspected infection and SIRS criteria that are NOT considered to be chronic conditions? (!)  Yes  -SB        Organ dysfunction  --        Date of presentation of severe sepsis  --        Time of presentation of severe sepsis  --        Tissue hypoperfusion persists in the hour after crystalloid fluid administration, evidenced, by either:  --        Was hypotension present within one hour of the conclusion of crystalloid fluid administration?  --        Date of presentation of septic shock  --        Time of presentation of septic shock  --          User Key  (r) = Recorded By, (t) = Taken By, (c) = Cosigned By    [address] Name Provider Type    SB Lesa Oreilly PA-C Physician Assistant                  MDM  Number of Diagnoses or Management Options  Pneumonia:   Diagnosis management comments: DDX includes but not ltd to:   Concern for developing infection, UTI, pneumonia, flu, alternate developing infection  Consider electrolyte abnormality, consider TIA, CVA, intracranial abnormality, lesion or bleed although known trauma      Plan is to obtain:  CBC to check for anemia, leukocytosis, hydration status  Chemistry panel to check for lyte abnormalities, organ function   EKG/trop to check for ischemic changes   CXR to check for congestive changes, active pulmonary disease   UA for UTI, hydration status, hematuria     Based on results:  Admit for IV abx, add on lactic and BC  tx for HCAP pna  Return parameters discussed  Pt requires f/u as an outpt  Pt expresses understanding w above treatment plan  All questions answered prior to d/c  Portions of the record may have been created with voice recognition software   Occasional wrong word or "sound a like" substitutions may have occurred due to the inherent limitations of voice recognition software   Read the chart carefully and recognize, using context, where substitutions have occurred  CritCare Time    Disposition  Final diagnoses:   Pneumonia     Time reflects when diagnosis was documented in both MDM as applicable and the Disposition within this note     Time User Action Codes Description Comment    12/19/2018  1:32 PM Tonja Kyle Add [J18 [id number]] Pneumonia       ED Disposition     ED Disposition Condition Comment    Admit  Case was discussed with Barrett and the patient's admission status was agreed to be Admission Status: inpatient status to the service of Dr Bobby Martinez   Follow-up Information    None         Patient's Medications   Discharge Prescriptions    No medications on file     No discharge procedures on file      ED Provider  Electronically Signed by           Wil Martin PA-C  12/19/18 3745

## 2018-12-19 NOTE — ED NOTES
Attempt made to ambulate patient  Pt was not able to fully stand after dangling at bedside  Retried multiple times, pt stated "I just can't do it" Pt placed back fully into stretcher, guardrails up, call bell placed within reach, instructed to call for assistance  VSS  Provider made aware       Kirit Machado RN  12/19/18 4077

## 2018-12-19 NOTE — ASSESSMENT & PLAN NOTE
· According to the ER sign out and report, patient had some alteration mental status in the facility  · Likely disease resolved as patient is presently answering questions appropriately and relatively oriented  Patient likely back at her baseline  Patient has baseline dementia  · Likely disease due to patient's infection/pneumonia  · Observe    · CT scan of the head was negative  · For further workup management if this worsens significantly

## 2018-12-19 NOTE — PLAN OF CARE
DISCHARGE PLANNING     Discharge to home or other facility with appropriate resources Progressing        INFECTION - ADULT     Absence or prevention of progression during hospitalization Progressing        Knowledge Deficit     Patient/family/caregiver demonstrates understanding of disease process, treatment plan, medications, and discharge instructions Progressing        MUSCULOSKELETAL - ADULT     Maintain or return mobility to safest level of function Progressing        PAIN - ADULT     Verbalizes/displays adequate comfort level or baseline comfort level Progressing        SAFETY ADULT     Patient will remain free of falls Progressing

## 2018-12-19 NOTE — RESPIRATORY THERAPY NOTE
RT Protocol Note  Veneda Fitting [de-identified] y o  female MRN: [id number]  Unit/Bed#: -01 Encounter: [id number]    Assessment    Principal Problem:    HCAP (healthcare-associated pneumonia)  Active Problems:    Encephalopathy    Dementia    COPD (chronic obstructive pulmonary disease) ([facility])    Generalized weakness      Home Pulmonary Medications:  None       Past Medical History:   Diagnosis Date    Anxiety     COPD (chronic obstructive pulmonary disease) ([address] )     Dementia     DJD (degenerative joint disease)     Gait disturbance     Hypertension     Insomnia     Major depression     Vitamin B12 deficiency     Vitamin D deficiency      Social History     Social History    Marital status:      Spouse name: N/A    Number of children: N/A    Years of education: N/A     Social History Main Topics    Smoking status: Unknown If Ever Smoked    Smokeless tobacco: Never Used    Alcohol use No    Drug use: No    Sexual activity: Not Asked     Other Topics Concern    None     Social History Narrative    None       Subjective         Objective    Physical Exam:   Assessment Type: Assess only  General Appearance: Awake, Alert  Respiratory Pattern: Normal  Chest Assessment: Chest expansion symmetrical  Bilateral Breath Sounds: Diminished, Clear    Vitals:  Blood pressure 123/59, pulse 81, temperature 98 7 °F (37 1 °C), temperature source Oral, resp  rate 18, height 5' 3" (1 6 m), weight 89 3 kg (196 lb 13 9 oz), SpO2 99 %  Imaging and other studies: I have personally reviewed pertinent reports  Plan    Respiratory Plan: No distress/Pulmonary history        Resp Comments: Respiratory protocol completed at this time  Pt is currently on 2L nasal cannula with O2 saturations 99%, HR 87  Pt does not wear oxygen at home  Pt does not take any nebulizers or inhalers at home  Pt stated she used to smoke  No indication for nebulizer tx's around the clock at this time

## 2018-12-20 LAB
ANION GAP SERPL CALCULATED.3IONS-SCNC: 9 MMOL/L (ref 4–13)
ATRIAL RATE: 99 BPM
BASOPHILS # BLD AUTO: 0.01 THOUSANDS/ΜL (ref 0–0.1)
BASOPHILS NFR BLD AUTO: 0 % (ref 0–1)
BUN SERPL-MCNC: 14 MG/DL (ref 5–25)
CALCIUM SERPL-MCNC: 8 MG/DL (ref 8.3–10.1)
CHLORIDE SERPL-SCNC: 105 MMOL/L (ref 100–108)
CO2 SERPL-SCNC: 27 MMOL/L (ref 21–32)
CREAT SERPL-MCNC: 0.78 MG/DL (ref 0.6–1.3)
EOSINOPHIL # BLD AUTO: 0.02 THOUSAND/ΜL (ref 0–0.61)
EOSINOPHIL NFR BLD AUTO: 0 % (ref 0–6)
ERYTHROCYTE [DISTWIDTH] IN BLOOD BY AUTOMATED COUNT: 14.8 % (ref 11.6–15.1)
FLUAV AG SPEC QL: NORMAL
FLUBV AG SPEC QL: NORMAL
GFR SERPL CREATININE-BSD FRML MDRD: 72 ML/MIN/1.73SQ M
GLUCOSE SERPL-MCNC: 89 MG/DL (ref 65–140)
HCT VFR BLD AUTO: 33.8 % (ref 34.8–46.1)
HGB BLD-MCNC: 10.6 G/DL (ref 11.5–15.4)
IMM GRANULOCYTES # BLD AUTO: 0.03 THOUSAND/UL (ref 0–0.2)
IMM GRANULOCYTES NFR BLD AUTO: 1 % (ref 0–2)
LYMPHOCYTES # BLD AUTO: 0.88 THOUSANDS/ΜL (ref 0.6–4.47)
LYMPHOCYTES NFR BLD AUTO: 14 % (ref 14–44)
MCH RBC QN AUTO: 29.5 PG (ref 26.8–34.3)
MCHC RBC AUTO-ENTMCNC: 31.4 G/DL (ref 31.4–37.4)
MCV RBC AUTO: 94 FL (ref 82–98)
MONOCYTES # BLD AUTO: 0.47 THOUSAND/ΜL (ref 0.17–1.22)
MONOCYTES NFR BLD AUTO: 7 % (ref 4–12)
MRSA NOSE QL CULT: NORMAL
NEUTROPHILS # BLD AUTO: 5.05 THOUSANDS/ΜL (ref 1.85–7.62)
NEUTS SEG NFR BLD AUTO: 78 % (ref 43–75)
NRBC BLD AUTO-RTO: 0 /100 WBCS
P AXIS: 45 DEGREES
PLATELET # BLD AUTO: 136 THOUSANDS/UL (ref 149–390)
PMV BLD AUTO: 10.3 FL (ref 8.9–12.7)
POTASSIUM SERPL-SCNC: 3.8 MMOL/L (ref 3.5–5.3)
PR INTERVAL: 144 MS
QRS AXIS: 52 DEGREES
QRSD INTERVAL: 70 MS
QT INTERVAL: 364 MS
QTC INTERVAL: 467 MS
RBC # BLD AUTO: 3.59 MILLION/UL (ref 3.81–5.12)
RSV B RNA SPEC QL NAA+PROBE: NORMAL
SODIUM SERPL-SCNC: 141 MMOL/L (ref 136–145)
T WAVE AXIS: 74 DEGREES
VENTRICULAR RATE: 99 BPM
WBC # BLD AUTO: 6.46 THOUSAND/UL (ref 4.31–10.16)

## 2018-12-20 PROCEDURE — 97167 OT EVAL HIGH COMPLEX 60 MIN: CPT

## 2018-12-20 PROCEDURE — G8978 MOBILITY CURRENT STATUS: HCPCS

## 2018-12-20 PROCEDURE — G8979 MOBILITY GOAL STATUS: HCPCS

## 2018-12-20 PROCEDURE — G8987 SELF CARE CURRENT STATUS: HCPCS

## 2018-12-20 PROCEDURE — 99232 SBSQ HOSP IP/OBS MODERATE 35: CPT | Performed by: INTERNAL MEDICINE

## 2018-12-20 PROCEDURE — 93010 ELECTROCARDIOGRAM REPORT: CPT | Performed by: INTERNAL MEDICINE

## 2018-12-20 PROCEDURE — G8988 SELF CARE GOAL STATUS: HCPCS

## 2018-12-20 PROCEDURE — 85025 COMPLETE CBC W/AUTO DIFF WBC: CPT | Performed by: INTERNAL MEDICINE

## 2018-12-20 PROCEDURE — 80048 BASIC METABOLIC PNL TOTAL CA: CPT | Performed by: INTERNAL MEDICINE

## 2018-12-20 PROCEDURE — 97163 PT EVAL HIGH COMPLEX 45 MIN: CPT

## 2018-12-20 RX ADMIN — TIOTROPIUM BROMIDE 18 MCG: 18 CAPSULE ORAL; RESPIRATORY (INHALATION) at 09:22

## 2018-12-20 RX ADMIN — RISPERIDONE 0.5 MG: 0.25 TABLET ORAL at 21:18

## 2018-12-20 RX ADMIN — CITALOPRAM HYDROBROMIDE 10 MG: 20 TABLET ORAL at 09:20

## 2018-12-20 RX ADMIN — ENOXAPARIN SODIUM 40 MG: 40 INJECTION SUBCUTANEOUS at 09:19

## 2018-12-20 RX ADMIN — BUDESONIDE AND FORMOTEROL FUMARATE DIHYDRATE 2 PUFF: 160; 4.5 AEROSOL RESPIRATORY (INHALATION) at 09:21

## 2018-12-20 RX ADMIN — VITAM B12 100 MCG: 100 TAB at 09:19

## 2018-12-20 RX ADMIN — ACETAMINOPHEN 488 MG: 325 TABLET, FILM COATED ORAL at 09:19

## 2018-12-20 RX ADMIN — GUAIFENESIN 600 MG: 600 TABLET, EXTENDED RELEASE ORAL at 09:20

## 2018-12-20 RX ADMIN — CEFEPIME HYDROCHLORIDE 2000 MG: 2 INJECTION, POWDER, FOR SOLUTION INTRAVENOUS at 02:12

## 2018-12-20 RX ADMIN — BUDESONIDE AND FORMOTEROL FUMARATE DIHYDRATE 2 PUFF: 160; 4.5 AEROSOL RESPIRATORY (INHALATION) at 18:38

## 2018-12-20 RX ADMIN — GUAIFENESIN 600 MG: 600 TABLET, EXTENDED RELEASE ORAL at 21:18

## 2018-12-20 RX ADMIN — POTASSIUM CHLORIDE 10 MEQ: 750 TABLET, EXTENDED RELEASE ORAL at 09:19

## 2018-12-20 RX ADMIN — SODIUM CHLORIDE 75 ML/HR: 0.9 INJECTION, SOLUTION INTRAVENOUS at 12:13

## 2018-12-20 RX ADMIN — VITAMIN D, TAB 1000IU (100/BT) 1000 UNITS: 25 TAB at 09:20

## 2018-12-20 RX ADMIN — CEFEPIME HYDROCHLORIDE 2000 MG: 2 INJECTION, POWDER, FOR SOLUTION INTRAVENOUS at 13:30

## 2018-12-20 RX ADMIN — DONEPEZIL HYDROCHLORIDE 10 MG: 5 TABLET ORAL at 21:18

## 2018-12-20 RX ADMIN — ACETAMINOPHEN 488 MG: 325 TABLET, FILM COATED ORAL at 00:29

## 2018-12-20 NOTE — PLAN OF CARE
Problem: PHYSICAL THERAPY ADULT  Goal: Performs mobility at highest level of function for planned discharge setting  See evaluation for individualized goals  Treatment/Interventions: LE strengthening/ROM, Therapeutic exercise, Endurance training, Patient/family training, Equipment eval/education, Bed mobility, Spoke to MD, Spoke to nursing, Continued evaluation, OT Amy Castorena OT)          See flowsheet documentation for full assessment, interventions and recommendations  Prognosis: Fair  Problem List: Decreased strength, Decreased range of motion, Decreased endurance, Impaired balance, Decreased mobility, Decreased cognition, Obesity, Pain (back safety precautions)  Assessment: Pt is [de-identified] y o  female seen for PT evaluation s/p admit to [facility] on 2018 w/ HCAP (healthcare-associated pneumonia)  PT consulted to assess pt's functional mobility and d/c needs  Order placed for PT eval and tx, w/ up and OOB as tolerated order  Performed at least 2 patient identifiers during session: Name and   Comorbidities affecting pt's physical performance at time of assessment include: anxiety, COPD, dementia, DJD, gait disturbance, hypertension, insomnia, major depression, vitamin B12 deficiency, vitamin D deficiency  PTA, pt was requiring A for mobility, ADLs and IADLs, resident of [facility] and retired  Personal factors affecting pt at time of IE include: inability to ambulate household distances, decreased cognition, decreased initiation and engagement, depression, inability to perform IADLs, inability to perform ADLs and inability to live alone  Please find objective findings from PT assessment regarding body systems outlined above with impairments and limitations including weakness, decreased ROM, impaired balance, decreased endurance, pain, decreased activity tolerance, decreased functional mobility tolerance, fall risk, back safety precautions and decreased cognition   The following objective measures performed on IE also reveal limitations: Barthel Index: 15/100 and Modified Kay: 5 (severe disability)  Pt's clinical presentation is currently unstable/unpredictable seen in pt's presentation of ongoing medical management/monitoring, evident in need for assist w/ all phases of mobility and chronic pain impacting overall mobility status  Pt to benefit from continued PT tx to address deficits as defined above and maximize level of functional independent mobility and consistency  From PT/mobility standpoint, recommendation at time of d/c would be STR pending progress in order to facilitate return to PLOF  Barriers to Discharge: Inaccessible home environment, Decreased caregiver support     Recommendation: Short-term skilled PT     PT - OK to Discharge: Yes (when medically cleared; if to STR)    See flowsheet documentation for full assessment

## 2018-12-20 NOTE — OCCUPATIONAL THERAPY NOTE
Occupational Therapy Evaluation        Patient Name: Gertrude Gustafson  NBTYL'L Date: 12/20/2018 12/20/18 1120   Note Type   Note type Eval only   Restrictions/Precautions   Weight Bearing Precautions Per Order No   Braces or Orthoses Other (Comment)  (None per patient)   Other Precautions Droplet precautions;Contact/isolation; Bed Alarm;Cognitive; Fall Risk;Pain   Pain Assessment   Pain Assessment 0-10   Pain Score 5   Pain Type Chronic pain   Pain Location Hip   Pain Orientation Left   Pain Descriptors Sharp   Pain Frequency Constant/continuous   Pain Onset Ongoing   Clinical Progression Not changed   Home Living   Type of Home Assisted living  ([place] [address])   Home Layout One level;Performs ADLs on one level; Able to live on main level with bedroom/bathroom   Bathroom Shower/Tub (patient reported sponge bathing only)   [address] bars around toilet   [address]   Prior Function   Level of [place] Needs assistance with ADLs and functional mobility; Needs assistance with IADLs   Lives With Facility staff   Receives Help From Other (Comment)  (Facility staff)   ADL Assistance Needs assistance   IADLs Needs assistance   Falls in the last 6 months 0   Vocational Retired   Lifestyle   Autonomy Patient unable to provide a clear description of her PLOF   Patient reported living at [place] [address], able to ambulate with or without the walker, reported ability to do some of her ADLs, however indicated that she has been mostly in bed and not able to do much (per patient's report)   Psychosocial   Psychosocial (WDL) WDL   ADL   Eating Assistance 6  Modified independent   Grooming Assistance 4  Minimal Assistance   UB Bathing Assistance 2  Maximal Assistance   LB [address] 1  Total [address] 2  Maximal [address] 1  Total Assistance Toileting Assistance  2  Maximal Assistance   Functional Assistance 2  Maximal Assistance   Bed Mobility   Supine to Sit 1  Dependent  (attempted , completed 25 % of movement, severe pain)   Transfers   Sit to Stand Unable to assess  (deferred, severe pain, unable to sit)   Functional Mobility   Functional Mobility 1  Total assistance   Activity Tolerance   Activity Tolerance Patient limited by pain;Treatment limited secondary to medical complications (Comment)  (pain and discomfort/ low motivation?)   Nurse Made Aware RN verbalized aptient appropriate for therapy, made aware of therapy outcome  RUE Assessment   RUE Assessment X  (AROM below ~90 degrees)   RUE Strength   RUE Overall Strength Deficits  (3-/5)   LUE Assessment   LUE Assessment X  (AROM limited to ~ 90 degrees of shoulder flexion )   LUE Strength   LUE Overall Strength Deficits  (3-/5)   Hand Function   Gross Motor Coordination Impaired   Fine Motor Coordination Impaired   Sensation   Light Touch No apparent deficits  (BUEs)   Vision-Basic Assessment   Current Vision Wears glasses all the time   Cognition   Overall Cognitive Status Impaired   Arousal/Participation Alert; Responsive; Cooperative   Attention Attends with cues to redirect   Orientation Level Oriented to person;Oriented to place; Disoriented to time;Disoriented to situation   Memory Decreased short term memory;Decreased recall of recent events;Decreased recall of biographical information   Following Commands Follows one step commands without difficulty   Assessment   Limitation Decreased ADL status; Decreased UE ROM; Decreased UE strength;Decreased Safe judgement during ADL;Decreased cognition;Decreased endurance;Decreased fine motor control;Decreased self-care trans;Decreased high-level ADLs   Prognosis Good   Assessment Patient is a [de-identified] y o  female seen for OT evaluation s/p admit to [address] [facility] on 12/19/2018 w/HCAP (healthcare-associated pneumonia)Patient presented to ED due to alteration mental status with confusion as well as shortness of breath with gurgling breath sounds noted  Commorbidities affecting patient's functional performance at time of assessment include: Dementia, Generalized weakness,  Encephalopathy, COPD  Orders placed for OT evaluation and treatment  Performed at least two patient identifiers during session including name and wristband  Prior to admission,  Patient unable to provide a clear description of her PLOF  Patient reported living at [facility], able to ambulate with or without the walker, reported ability to do some of her ADLs, however indicated that she has been mostly in bed and not able to do much (per patient's report)  Personal factors affecting patient at time of initial evaluation include: limited insight into deficits, decreased initiation and engagement and difficulty performing ADLs  Upon evaluation, patient requires maximal assist for UB ADLs, total assist for LB ADLs  Occupational performance is affected by the following deficits: orientation, attention span, decreased functional use of BUEs, limited active ROM, decreased muscle strength, degenerative arthritic joint changes, impaired gross motor coordination, impaired fine motor coordination, dynamic sit/ stand balance deficit with poor standing tolerance time for self care and functional mobility, decreased activity tolerance, impaired memory, impaired problem solving, decreased safety awareness, increased pain and postural control and postural alignment deficit, requiring external assistance to complete transitional movements  Therapist completed  extensive additional review of medical records and additional review of physical, cognitive or psychosocial history, clinical examination identifying 5 or more performance deficits, clinical decision making of a high complexity , consistent with a high complexity level evaluation   Patient to benefit from continued Occupational Therapy treatment while in the hospital to address deficits as defined above and maximize level of functional independence with ADLs and functional mobility  Occupational Performance areas to address include: grooming , bathing/ shower, dressing, toilet hygiene, transfer to all surfaces, functional mobility, IADLs: safety procedures, Leisure Participation and Social participation  Plan   Treatment Interventions ADL retraining;Functional transfer training;UE strengthening/ROM; Endurance training;Patient/family training;Equipment evaluation/education; Fine motor coordination activities; Compensatory technique education;Continued evaluation;Cardiac education; Energy conservation; Activityengagement   Goal Expiration Date 01/03/19   OT Frequency 3-5x/wk   Barthel Index   Feeding 5   Bathing 0   Grooming Score 0   Dressing Score 0   Bladder Score 5   Bowels Score 5   Toilet Use Score 0   Transfers (Bed/Chair) Score 0   Mobility (Level Surface) Score 0   Stairs Score 0   Barthel Index Score 15   Modified [address] Scale   Modified Tanya Scale 5       Occupational Therapy goals: In 7-14 days:     1- Patient will verbalize and demonstrate use of energy conservation/ deep breathing technique and work simplification skills during functional activity with no verbal cues  2-Patient will verbalize and demonstrate good body mechanics and joint protection techniques during  ADLs/ IADLs with no verbal cues  3- Patient will increase OOB/ sitting tolerance to 2-4 hours per day for increased participation in self care and leisure tasks with no s/s of exertion  4-Patient will increase standing tolerance time to 5  minutes with unilateral UE support to complete sink level ADLs@ mod I level   5- Patient will increase sitting tolerance at edge of bed to 20 minutes to complete UB ADLs @ set up assist level  6- Patient will transfer bed to Chair / toilet at Set up assist level with AD as indicated    7- Patient will complete UB ADLs with set up assist  8- Patient will complete LB ADLs with min assist with the use of adaptive equipment  9- Patient will complete toileting hygiene with set up assist/ supervision for thoroughness    10-Patient/ Family  will demonstrate competency with UE Home Exercise Program

## 2018-12-20 NOTE — UTILIZATION REVIEW
Initial Clinical Review    Admission: Date/Time/Statement: 12/19/18 @ 1333     Orders Placed This Encounter   Procedures    Inpatient Admission (expected length of stay for this patient is greater than two midnights)     Standing Status:   Standing     Number of Occurrences:   1     Order Specific Question:   Admitting Physician     Answer:   Rosana Johns [[id number]]     Order Specific Question:   Level of Care     Answer:   Med Surg [[id number]]     Order Specific Question:   Estimated length of stay     Answer:   More than 2 Midnights     Order Specific Question:   Certification     Answer:   I certify that inpatient services are medically necessary for this patient for a duration of greater than two midnights  See H&P and MD Progress Notes for additional information about the patient's course of treatment  ED: Date/Time/Mode of Arrival:   ED Arrival Information     Expected Arrival Acuity Means of Arrival Escorted By Service Admission Type    - 12/19/2018 09:20 Urgent Ambulance [address]  [facility] General Medicine Urgent    Arrival Complaint    SOB          Chief Complaint:   Chief Complaint   Patient presents with    Weakness - Generalized     Pt comes in via EMS for evaluation for generalized weakness and altere mental status per staff at [facility] care home   Altered Mental Status       History of Illness: [de-identified] yo female to ED from NH via EMS  w/ c/o altered mental status and confusion as well as SOB , gurgling BS   Difficulty walking in facility        PE : dec BS R base    ED Vital Signs:   ED Triage Vitals [12/19/18 0923]   Temperature Pulse Respirations Blood Pressure SpO2   98 7 °F (37 1 °C) 99 17 127/57 (!) 89 %      Temp Source Heart Rate Source Patient Position - Orthostatic VS BP Location FiO2 (%)   Oral Monitor Lying Right arm --      Pain Score       3        Wt Readings from Last 1 Encounters:   12/19/18 89 3 kg (196 lb 13 9 oz)       Vital Signs (abnormal):   12/19/18 1427  --  91  18  107/53 --  96 %  Nasal cannula  --   12/19/18 1242  --  100  22  126/58  --  95 %  Nasal cannula  Sitting   12/19/18 1121  --  96  20  112/66  --  98 %  Nasal cannula       Abnormal Labs/Diagnostic Test Results: strep pneumoniae ua - + , pct 1 83, wbc  14 25, lipase   60, BNP   689, alb  2 9  CXR - Increased right upper lobe alveolar opacity and right pleural effusion   Pneumonia suspected  CT head- wnl   EKG- NSR     ED Treatment:   Medication Administration from 12/19/2018 0920 to 12/19/2018 1545       Date/Time Order Dose Route Action Action by Comments     12/19/2018 1412 midazolam (VERSED) injection 1 mg 1 mg Intramuscular Not Given Cindy Steinberg RN      12/19/2018 3266 LET gel 1 application 1 application Topical Not Given Cindy Steinberg RN      12/19/2018 1450 cefepime (MAXIPIME) 2,000 mg in dextrose 5 % 50 mL IVPB 0 mg Intravenous Stopped Cindy Steinberg RN      12/19/2018 1422 cefepime (MAXIPIME) 2,000 mg in dextrose 5 % 50 mL IVPB 2,000 mg Intravenous [address] Cindy Steinberg RN      12/19/2018 1451 azithromycin (ZITHROMAX) 500 mg in sodium chloride 0 9% 250mL IVPB 500 mg 500 mg Intravenous [address] Cindy Steinberg RN      12/19/2018 1530 sodium chloride 0 9 % bolus 500 mL 0 mL Intravenous Stopped Марина Ramos RN      12/19/2018 1426 sodium chloride 0 9 % bolus 500 mL 500 mL Intravenous New Bag Cindy Steinberg RN           Past Medical/Surgical History:    Active Ambulatory Problems     Diagnosis Date Noted    No Active Ambulatory Problems       Past Medical History:   Diagnosis Date    Anxiety     COPD (chronic obstructive pulmonary disease) (HCC)     Dementia     DJD (degenerative joint disease)     Gait disturbance     Hypertension     Insomnia     Major depression     Vitamin B12 deficiency     Vitamin D deficiency        Admitting Diagnosis: Pneumonia [J18 9]  Weakness generalized [R53 1]    Age/Sex: [de-identified] y o  female    Assessment/Plan:   * HCAP (healthcare-associated pneumonia)   Assessment & Plan     · Came from a personal living facility  · Present on admission, with cough noted in the emergency room and reports of shortness of breath in the facility  Chest x-ray revealed pneumonia  · Pneumonia orders done  · Likely gram-negative bacteria  · In the emergency room, patient was given IV cefepime, IV vancomycin and IV azithromycin  · I will continue with IV cefepime  · Follow-up results of the blood cultures  · Will get sputum cultures  · Urine for Legionella and streptococcal antigens  · Mucinex      COPD (chronic obstructive pulmonary disease) ([facility])   Assessment & Plan     · No exacerbation at this point  · Continue patient's nebulizations/inhalers  · According to the sign out, patient is on chronic oxygen at 2 L  Thus will continue with this       Encephalopathy   Assessment & Plan     · According to the ER sign out and report, patient had some alteration mental status in the facility  · Likely disease resolved as patient is presently answering questions appropriately and relatively oriented  Patient likely back at her baseline  Patient has baseline dementia  · Likely disease due to patient's infection/pneumonia  · Observe  · CT scan of the head was negative  · For further workup management if this worsens significantly      Generalized weakness   Assessment & Plan     · PT/OT eval and treat      Dementia   Assessment & Plan     · Supportive care        Anticipated Length of Stay:  Patient will be admitted on an Inpatient basis with an anticipated length of stay of  greater than 2 midnights     Justification for Hospital Stay:  Above findings and plans      Admission Orders:  Scheduled Meds:   Current Facility-Administered Medications:  acetaminophen 488 mg Oral Q6H PRN     albuterol 2 puff Inhalation Q4H PRN     budesonide-formoterol 2 puff Inhalation BID     cefepime 2,000 mg Intravenous Q12H  Last Rate: Stopped (12/20/18 7058)   cholecalciferol 1,000 Units Oral Daily     citalopram 10 mg Oral Daily     cyanocobalamin 100 mcg Oral Daily     donepezil 10 mg Oral HS     enoxaparin 40 mg Subcutaneous Daily     guaiFENesin 600 mg Oral Q12H HARRISON     ondansetron 4 mg Intravenous Q6H PRN     potassium chloride 10 mEq Oral Daily     risperiDONE 0 5 mg Oral HS     senna 1 tablet Oral HS PRN     sodium chloride 75 mL/hr Intravenous Continuous  Last Rate: 75 mL/hr (12/20/18 0025)   tiotropium 18 mcg Inhalation Daily         Reg diet   SCD  droplet isolation   OT PT eval   Up and OOB as holden   Straight cath   tele   Aspiration precautions  12/20 bmp, cbc  H&H  10 6  33 8

## 2018-12-20 NOTE — PLAN OF CARE
Problem: DISCHARGE PLANNING - CARE MANAGEMENT  Goal: Discharge to post-acute care or home with appropriate resources  INTERVENTIONS:  - Conduct assessment to determine patient/family and health care team treatment goals, and need for post-acute services based on payer coverage, community resources, and patient preferences, and barriers to discharge  - Address psychosocial, clinical, and financial barriers to discharge as identified in assessment in conjunction with the patient/family and health care team  - Arrange appropriate level of post-acute services according to patients   needs and preference and payer coverage in collaboration with the physician and health care team  - Communicate with and update the patient/family, physician, and health care team regarding progress on the discharge plan  - Arrange appropriate transportation to post-acute venues  Outcome: Progressing  Per RN and SLIM pt is confused and has hx of dementia  CM phoned pt's daughter, Alisia Rodrígeuz and lm for cb  CM phoned David Moon and s/w Diana Mendoza who reported pt is declining and they started discussing SNF with family as pt is not appropriate for PC  Diana Mendoza reported pt requires assistance with mobility and all ADL's  Per Diana Mendoza they have difficult time reaching family  CM confirmed contact information, per Diana Mendoza daughter Ru Landa #'s are: [phone] and [phone]  Diana Mendoza requested CM Dept update David Moon  CM lm for pt's daughter on both numbers provided and is awaiting cb  PT eval is pending  CM pended SNF referrals

## 2018-12-20 NOTE — PHYSICAL THERAPY NOTE
Physical Therapy Evaluation     Patient's Name: Domitila Gao    Admitting Diagnosis  Pneumonia [J18 9]  Weakness generalized [R53 1]    Problem List  Patient Active Problem List   Diagnosis    Encephalopathy    HCAP (healthcare-associated pneumonia)    Dementia    [facility] )    Generalized weakness       Past Medical History  Past Medical History:   Diagnosis Date    Anxiety     COPD [facility]chronic obstructive pulmonary disease) ([facility] )     Dementia     DJD (degenerative joint disease)     Gait disturbance     Hypertension     Insomnia     Major depression     Vitamin B12 deficiency     Vitamin D deficiency        Past Surgical History  History reviewed  No pertinent surgical history  12/20/18 1107   Note Type   Note type Eval only   Pain Assessment   Pain Assessment 0-10   Pain Score 5   Pain Type Chronic pain   Pain Location Hip   Pain Orientation Left   Pain Descriptors Sharp   Pain Frequency Constant/continuous   Pain Onset Ongoing   Clinical Progression Not changed   Hospital Pain Intervention(s) Repositioned   Home Living   Type of Home Assisted living  ([place] [address])   Home Layout One level;Performs ADLs on one level; Able to live on main level with bedroom/bathroom   Bathroom Shower/Tub (Sponge bathing)   Bathroom Toilet Standard   Bathroom Equipment Grab bars around toilet   [address]   Prior Function   Level of [place] Needs assistance with ADLs and functional mobility; Needs assistance with IADLs   Lives With Facility staff   Receives Help From Other (Comment)  (Facility staff)   ADL Assistance Needs assistance   IADLs Needs assistance   Falls in the last 6 months 0   Vocational Retired   Restrictions/Precautions   [place] Bearing Precautions Per Order No   Braces or Orthoses Other (Comment)  (None per patient)   Other Precautions Contact/isolation;Droplet precautions; Bed Alarm;O2;Fall Risk;Pain;Multiple lines;Cognitive;Aspiration  (back safety precautions)   General   Additional Pertinent History HPI: 59-year-old female patient presents to [id number] [place] [address] for evaluation of generalized weakness and AMS   Family/Caregiver Present No   Cognition   Overall Cognitive Status Impaired   Arousal/Participation Cooperative   Attention Attends with cues to redirect   Orientation Level Oriented to person;Oriented to place; Disoriented to time;Disoriented to situation   Memory Decreased short term memory;Decreased recall of recent events;Decreased recall of biographical information   Following Commands Follows one step commands without difficulty   Comments Patient agreeable to participate in PT evaluation   RUE Assessment   RUE Assessment X   RUE Overall AROM   R Shoulder Flexion to ~ 90 degrees   RUE Strength   RUE Overall Strength Deficits   LUE Assessment   LUE Assessment X   LUE Overall AROM   L Shoulder Flexion to ~ 90 degrees   LUE Strength   LUE Overall Strength Deficits   RLE Assessment   RLE Assessment X  (Deficits; 3-/5 based on functional assessment; pain)   LLE Assessment   LLE Assessment X  (Deficits; not formally assessed secondary to chronic hip pain; pt unable to perform ankle pump 2* pain)   Coordination   Movements are Fluid and Coordinated 1   Light Touch   RLE Light Touch Grossly intact   LLE Light Touch Grossly intact   Bed Mobility   Supine to Sit 2  Maximal assistance  (attempted; pt able to complete partial transfer only - 25%)   Additional items Assist x 2; Increased time required;Verbal cues;LE management;HOB elevated   Additional Comments Attempted supine to sit transfer; pt able to complete partial transfer - 25%; limited by chronic pain to left hip   Transfers   Sit to Stand Unable to assess   Ambulation/Elevation   Gait pattern Not appropriate; Not tested   Balance   Static Sitting (unable to assess)   Endurance Deficit   Endurance Deficit Yes   Endurance Deficit Description pain, general deconditioning   Activity Tolerance   Activity Tolerance Patient limited by pain   Alysa Fischer made aware of session outcomes via [address], RN verbalized patient appropriate for PT evaluation; made aware of session outcomes   Assessment   Prognosis Fair   Problem List Decreased strength;Decreased range of motion;Decreased endurance; Impaired balance;Decreased mobility; Decreased cognition;Obesity;Pain  (back safety precautions)   Assessment Pt is [de-identified] y o  female seen for PT evaluation s/p admit to [facility] on 2018 w/ HCAP (healthcare-associated pneumonia)  PT consulted to assess pt's functional mobility and d/c needs  Order placed for PT eval and tx, w/ up and OOB as tolerated order  Performed at least 2 patient identifiers during session: Name and   Comorbidities affecting pt's physical performance at time of assessment include: anxiety, COPD, dementia, DJD, gait disturbance, hypertension, insomnia, major depression, vitamin B12 deficiency, vitamin D deficiency  PTA, pt was requiring A for mobility, ADLs and IADLs, resident of [facility] and retired  Personal factors affecting pt at time of IE include: inability to ambulate household distances, decreased cognition, decreased initiation and engagement, depression, inability to perform IADLs, inability to perform ADLs and inability to live alone  Please find objective findings from PT assessment regarding body systems outlined above with impairments and limitations including weakness, decreased ROM, impaired balance, decreased endurance, pain, decreased activity tolerance, decreased functional mobility tolerance, fall risk, back safety precautions and decreased cognition  The following objective measures performed on IE also reveal limitations: Barthel Index: 15/100 and Modified Tanya: 5 (severe disability)   Pt's clinical presentation is currently unstable/unpredictable seen in pt's presentation of ongoing medical management/monitoring, evident in need for assist w/ all phases of mobility and chronic pain impacting overall mobility status  Pt to benefit from continued PT tx to address deficits as defined above and maximize level of functional independent mobility and consistency  From PT/mobility standpoint, recommendation at time of d/c would be STR pending progress in order to facilitate return to PLOF  Barriers to Discharge Inaccessible home environment;Decreased caregiver support   Goals   Patient Goals none stated: patient presents with impaired cognition   STG Expiration Date 12/30/18   Short Term Goal #1 In 7-10 days: Increase bilateral LE strength 1/2 grade to facilitate independent mobility, Perform all bed mobility tasks with min A of 1 to decrease caregiver burden and PT to see and establish goals for OOB functional transfers and ambulation when appropriate   Treatment Day 0   Plan   Treatment/Interventions LE strengthening/ROM; Therapeutic exercise; Endurance training;Patient/family training;Equipment eval/education; Bed mobility;Spoke to MD;Spoke to nursing;Continued evaluation;OT  Julio Cesar Albrecht, ARUN)   PT Frequency Other (Comment)  (3-5x/wk)   Recommendation   Recommendation Short-term skilled PT   PT - OK to Discharge Yes  (when medically cleared; if to STR)   Modified [place] Scale   Modified [place] Scale 5   Barthel Index   Feeding 5   Bathing 0   Grooming Score 0   Dressing Score 0   Bladder Score 5   Bowels Score 5   Toilet Use Score 0   Transfers (Bed/Chair) Score 0   Mobility (Level Surface) Score 0   Stairs Score 0   Barthel Index Score 15         Franca Ortega, PT, DPT

## 2018-12-20 NOTE — PLAN OF CARE
Problem: OCCUPATIONAL THERAPY ADULT  Goal: Performs self-care activities at highest level of function for planned discharge setting  See evaluation for individualized goals  Treatment Interventions: ADL retraining, Functional transfer training, UE strengthening/ROM, Endurance training, Patient/family training, Equipment evaluation/education, Fine motor coordination activities, Compensatory technique education, Continued evaluation, Cardiac education, Energy conservation, Activityengagement          See flowsheet documentation for full assessment, interventions and recommendations  Limitation: Decreased ADL status, Decreased UE ROM, Decreased UE strength, Decreased Safe judgement during ADL, Decreased cognition, Decreased endurance, Decreased fine motor control, Decreased self-care trans, Decreased high-level ADLs  Prognosis: Good  Assessment: Patient is a [de-identified] y o  female seen for OT evaluation s/p admit to [address] [facility] on 12/19/2018 w/HCAP (healthcare-associated pneumonia)Patient presented to ED due to alteration mental status with confusion as well as shortness of breath with gurgling breath sounds noted  Commorbidities affecting patient's functional performance at time of assessment include: Dementia, Generalized weakness,  Encephalopathy, COPD  Orders placed for OT evaluation and treatment  Performed at least two patient identifiers during session including name and wristband  Prior to admission,  Patient unable to provide a clear description of her PLOF  Patient reported living at [facility], able to ambulate with or without the walker, reported ability to do some of her ADLs, however indicated that she has been mostly in bed and not able to do much (per patient's report)  Personal factors affecting patient at time of initial evaluation include: limited insight into deficits, decreased initiation and engagement and difficulty performing ADLs   Upon evaluation, patient requires maximal assist for UB ADLs, total assist for LB ADLs  Occupational performance is affected by the following deficits: orientation, attention span, decreased functional use of BUEs, limited active ROM, decreased muscle strength, degenerative arthritic joint changes, impaired gross motor coordination, impaired fine motor coordination, dynamic sit/ stand balance deficit with poor standing tolerance time for self care and functional mobility, decreased activity tolerance, impaired memory, impaired problem solving, decreased safety awareness, increased pain and postural control and postural alignment deficit, requiring external assistance to complete transitional movements  Therapist completed  extensive additional review of medical records and additional review of physical, cognitive or psychosocial history, clinical examination identifying 5 or more performance deficits, clinical decision making of a high complexity , consistent with a high complexity level evaluation  Patient to benefit from continued Occupational Therapy treatment while in the hospital to address deficits as defined above and maximize level of functional independence with ADLs and functional mobility  Occupational Performance areas to address include: grooming , bathing/ shower, dressing, toilet hygiene, transfer to all surfaces, functional mobility, IADLs: safety procedures, Leisure Participation and Social participation

## 2018-12-20 NOTE — PROGRESS NOTES
[facility] Progress Note  Patient: Chantale Hill [de-identified] y o  female   MRN: [id number]  PCP: ODILIA Birch  Unit/Bed#: -01 Encounter: [id number]  Date Of Visit: 18    Assessment:    Principal Problem:    HCAP (healthcare-associated pneumonia)  Active Problems:    Encephalopathy    Dementia    COPD (chronic obstructive pulmonary disease) ([address] )    Generalized weakness      Plan:    · 1  HCAP- on cefepime  Strep antigen positive  · 2  Dementia- on aricept and reperdal  · 3  COPD- stable  · 4  Acute toxic metabolic encephalopathy- multifactorial, secondary to pneumonia in a baseline demented patient  VTE Pharmacologic Prophylaxis:   Pharmacologic: Enoxaparin (Lovenox)  Mechanical VTE Prophylaxis in Place: Yes    Patient Centered Rounds: I have performed bedside rounds with nursing staff today  Discussions with Specialists or Other Care Team Provider:     Education and Discussions with Family / Patient:     Time Spent for Care: 20 minutes  More than 50% of total time spent on counseling and coordination of care as described above  Current Length of Stay: 1 day(s)    Current Patient Status: Inpatient   Certification Statement: The patient will continue to require additional inpatient hospital stay due to pnemonia    Discharge Plan / Estimated Discharge Date: once pneumonia improves    Code Status: Level 3 - DNAR and DNI      Subjective:   Patient seen and examined at bedside  Patient has no new complaints  Objective:     Vitals:   Temp (24hrs), Av 7 °F (37 1 °C), Min:97 6 °F (36 4 °C), Max:100 °F (37 8 °C)    Temp:  [97 6 °F (36 4 °C)-100 °F (37 8 °C)] 97 6 °F (36 4 °C)  HR:  [] 89  Resp:  [17-22] 18  BP: (107-145)/(53-66) 145/65  SpO2:  [95 %-99 %] 98 %  Body mass index is 34 87 kg/m²  Input and Output Summary (last 24 hours):        Intake/Output Summary (Last 24 hours) at 18 1119  Last data filed at 18 0943   Gross per 24 hour   Intake 2060 ml   Output              421 ml   Net             1639 ml       Physical Exam:     Physical Exam   Constitutional: She appears well-developed and well-nourished  HENT:   Head: Normocephalic and atraumatic  Eyes: Pupils are equal, round, and reactive to light  Conjunctivae and EOM are normal    Neck: Normal range of motion  Neck supple  No JVD present  No tracheal deviation present  No thyromegaly present  Cardiovascular: Normal rate, regular rhythm and normal heart sounds  Exam reveals no gallop and no friction rub  No murmur heard  Pulmonary/Chest: Effort normal and breath sounds normal  No respiratory distress  She has no wheezes  She has no rales  Abdominal: Soft  Bowel sounds are normal  She exhibits no distension  There is no tenderness  There is no rebound  Musculoskeletal: Normal range of motion  She exhibits no edema  Neurological: She is alert  Skin: Skin is warm and dry  No rash noted  No erythema  Vitals reviewed  Additional Data:     Labs:      Results from last 7 days  Lab Units 12/20/18  0535   WBC Thousand/uL 6 46   HEMOGLOBIN g/dL 10 6*   HEMATOCRIT % 33 8*   PLATELETS Thousands/uL 136*   NEUTROS PCT % 78*   LYMPHS PCT % 14   MONOS PCT % 7   EOS PCT % 0       Results from last 7 days  Lab Units 12/20/18  0535 12/19/18  1041   POTASSIUM mmol/L 3 8 4 4   CHLORIDE mmol/L 105 104   CO2 mmol/L 27 30   BUN mg/dL 14 13   CREATININE mg/dL 0 78 0 97   CALCIUM mg/dL 8 0* 8 7   ALK PHOS U/L  --  86   ALT U/L  --  18   AST U/L  --  13           * I Have Reviewed All Lab Data Listed Above  * Additional Pertinent Lab Tests Reviewed:  [facility] Admission Reviewed    Imaging:    Imaging Reports Reviewed Today Include:   Imaging Personally Reviewed by Myself Includes:      Recent Cultures (last 7 days):       Results from last 7 days  Lab Units 12/19/18  1647 12/19/18  1416   INFLUENZA B PCR  None Detected  --    RSV PCR  None Detected  --    LEGIONELLA URINARY ANTIGEN   --  Negative       Last 24 Hours Medication List:     Current Facility-Administered Medications:  acetaminophen 488 mg Oral Q6H PRN oJsué Hyde MD    albuterol 2 puff Inhalation Q4H PRN Josué Hyde MD    budesonide-formoterol 2 puff Inhalation BID Josué Hyde MD    cefepime 2,000 mg Intravenous Q12H Josué Hyde MD Last Rate: Stopped (12/20/18 0255)   cholecalciferol 1,000 Units Oral Daily Josué Hyde MD    citalopram 10 mg Oral Daily Josué Hyde MD    cyanocobalamin 100 mcg Oral Daily Josué Hyde MD    donepezil 10 mg Oral HS Josué Hyde MD    enoxaparin 40 mg Subcutaneous Daily Josué Hyde MD    guaiFENesin 600 mg Oral Q12H [address] Josué Hyde MD    ondansetron 4 mg Intravenous Q6H PRN Josué Hyde MD    potassium chloride 10 mEq Oral Daily Josué Hyde MD    risperiDONE 0 5 mg Oral HS Josué Hyde MD    senna 1 tablet Oral HS PRN Josué Hyde MD    sodium chloride 75 mL/hr Intravenous Continuous Yassine Jordan PA-C Last Rate: 75 mL/hr (12/20/18 0025)   tiotropium 18 mcg Inhalation Daily Cathryn Hyde MD         Today, Patient Was Seen By: Brendon Brown MD    ** Please Note: This note has been constructed using a voice recognition system   **

## 2018-12-20 NOTE — SOCIAL WORK
Per RN and SLIM pt is confused and has hx of dementia  CM phoned pt's daughter, Florentino Johnson and lm for cb  CM phoned Keren Mabry and s/w Shanae Sauceda who reported pt is declining and they started discussing SNF with family as pt is not appropriate for [facility]  Shanae Sauceda reported pt requires assistance with mobility and all ADL's  Per Shanae Sohail they have difficult time reaching family  CM confirmed contact information, per Shanae Sauceda daughter Tello Hope #'s are: [phone] and [phone]  Shanae Sauceda requested CM Dept update Keren Mabry  CM lm for pt's daughter on both numbers provided and is awaiting cb  PT gautam is pending  CM pended SNF referrals

## 2018-12-21 LAB
ANION GAP SERPL CALCULATED.3IONS-SCNC: 8 MMOL/L (ref 4–13)
BASOPHILS # BLD AUTO: 0.02 THOUSANDS/ΜL (ref 0–0.1)
BASOPHILS NFR BLD AUTO: 1 % (ref 0–1)
BUN SERPL-MCNC: 11 MG/DL (ref 5–25)
CALCIUM SERPL-MCNC: 8 MG/DL (ref 8.3–10.1)
CHLORIDE SERPL-SCNC: 104 MMOL/L (ref 100–108)
CO2 SERPL-SCNC: 27 MMOL/L (ref 21–32)
CREAT SERPL-MCNC: 0.7 MG/DL (ref 0.6–1.3)
EOSINOPHIL # BLD AUTO: 0.04 THOUSAND/ΜL (ref 0–0.61)
EOSINOPHIL NFR BLD AUTO: 1 % (ref 0–6)
ERYTHROCYTE [DISTWIDTH] IN BLOOD BY AUTOMATED COUNT: 14.4 % (ref 11.6–15.1)
GFR SERPL CREATININE-BSD FRML MDRD: 82 ML/MIN/1.73SQ M
GLUCOSE SERPL-MCNC: 97 MG/DL (ref 65–140)
HCT VFR BLD AUTO: 32.1 % (ref 34.8–46.1)
HGB BLD-MCNC: 10.1 G/DL (ref 11.5–15.4)
IMM GRANULOCYTES # BLD AUTO: 0.03 THOUSAND/UL (ref 0–0.2)
IMM GRANULOCYTES NFR BLD AUTO: 1 % (ref 0–2)
LYMPHOCYTES # BLD AUTO: 0.65 THOUSANDS/ΜL (ref 0.6–4.47)
LYMPHOCYTES NFR BLD AUTO: 16 % (ref 14–44)
MCH RBC QN AUTO: 29.4 PG (ref 26.8–34.3)
MCHC RBC AUTO-ENTMCNC: 31.5 G/DL (ref 31.4–37.4)
MCV RBC AUTO: 94 FL (ref 82–98)
MONOCYTES # BLD AUTO: 0.26 THOUSAND/ΜL (ref 0.17–1.22)
MONOCYTES NFR BLD AUTO: 7 % (ref 4–12)
NEUTROPHILS # BLD AUTO: 2.96 THOUSANDS/ΜL (ref 1.85–7.62)
NEUTS SEG NFR BLD AUTO: 74 % (ref 43–75)
NRBC BLD AUTO-RTO: 0 /100 WBCS
PLATELET # BLD AUTO: 150 THOUSANDS/UL (ref 149–390)
PMV BLD AUTO: 10.2 FL (ref 8.9–12.7)
POTASSIUM SERPL-SCNC: 3.9 MMOL/L (ref 3.5–5.3)
PROCALCITONIN SERPL-MCNC: 1.79 NG/ML
RBC # BLD AUTO: 3.43 MILLION/UL (ref 3.81–5.12)
SODIUM SERPL-SCNC: 139 MMOL/L (ref 136–145)
WBC # BLD AUTO: 3.96 THOUSAND/UL (ref 4.31–10.16)

## 2018-12-21 PROCEDURE — 80048 BASIC METABOLIC PNL TOTAL CA: CPT | Performed by: INTERNAL MEDICINE

## 2018-12-21 PROCEDURE — 85025 COMPLETE CBC W/AUTO DIFF WBC: CPT | Performed by: INTERNAL MEDICINE

## 2018-12-21 PROCEDURE — 84145 PROCALCITONIN (PCT): CPT | Performed by: INTERNAL MEDICINE

## 2018-12-21 PROCEDURE — 99232 SBSQ HOSP IP/OBS MODERATE 35: CPT | Performed by: INTERNAL MEDICINE

## 2018-12-21 RX ADMIN — VITAMIN D, TAB 1000IU (100/BT) 1000 UNITS: 25 TAB at 09:25

## 2018-12-21 RX ADMIN — GUAIFENESIN 600 MG: 600 TABLET, EXTENDED RELEASE ORAL at 09:25

## 2018-12-21 RX ADMIN — BUDESONIDE AND FORMOTEROL FUMARATE DIHYDRATE 2 PUFF: 160; 4.5 AEROSOL RESPIRATORY (INHALATION) at 09:25

## 2018-12-21 RX ADMIN — BUDESONIDE AND FORMOTEROL FUMARATE DIHYDRATE 2 PUFF: 160; 4.5 AEROSOL RESPIRATORY (INHALATION) at 18:23

## 2018-12-21 RX ADMIN — POTASSIUM CHLORIDE 10 MEQ: 750 TABLET, EXTENDED RELEASE ORAL at 09:25

## 2018-12-21 RX ADMIN — RISPERIDONE 0.5 MG: 0.25 TABLET ORAL at 21:06

## 2018-12-21 RX ADMIN — TIOTROPIUM BROMIDE 18 MCG: 18 CAPSULE ORAL; RESPIRATORY (INHALATION) at 09:25

## 2018-12-21 RX ADMIN — CEFEPIME HYDROCHLORIDE 2000 MG: 2 INJECTION, POWDER, FOR SOLUTION INTRAVENOUS at 14:23

## 2018-12-21 RX ADMIN — VITAM B12 100 MCG: 100 TAB at 09:25

## 2018-12-21 RX ADMIN — CITALOPRAM HYDROBROMIDE 10 MG: 20 TABLET ORAL at 09:25

## 2018-12-21 RX ADMIN — DONEPEZIL HYDROCHLORIDE 10 MG: 5 TABLET ORAL at 21:06

## 2018-12-21 RX ADMIN — CEFEPIME HYDROCHLORIDE 2000 MG: 2 INJECTION, POWDER, FOR SOLUTION INTRAVENOUS at 03:00

## 2018-12-21 RX ADMIN — ENOXAPARIN SODIUM 40 MG: 40 INJECTION SUBCUTANEOUS at 09:25

## 2018-12-21 RX ADMIN — GUAIFENESIN 600 MG: 600 TABLET, EXTENDED RELEASE ORAL at 21:06

## 2018-12-21 NOTE — PROGRESS NOTES
[facility] Progress Note  Patient: Lexx Crabtree [de-identified] y o  female   MRN: [id number]  PCP: ODILIA Sullivan  Unit/Bed#: -01 Encounter: [id number]  Date Of Visit: 18    Assessment:    Principal Problem:    HCAP (healthcare-associated pneumonia)  Active Problems:    Encephalopathy    Dementia    COPD (chronic obstructive pulmonary disease) ([address] )    Generalized weakness      Plan:    · 1  HCAP- on cefepime  Strep antigen positive  Blood cx positive 1/2 bottles for gram + cocci in clusters  · 2  Dementia- on aricept and reperdal  · 3  COPD- stable  · 4  Acute toxic metabolic encephalopathy- multifactorial, secondary to pneumonia in a baseline demented patient  VTE Pharmacologic Prophylaxis:   Pharmacologic: Enoxaparin (Lovenox)  Mechanical VTE Prophylaxis in Place: Yes    Patient Centered Rounds: I have performed bedside rounds with nursing staff today  Discussions with Specialists or Other Care Team Provider:     Education and Discussions with Family / Patient:     Time Spent for Care: 20 minutes  More than 50% of total time spent on counseling and coordination of care as described above  Current Length of Stay: 2 day(s)    Current Patient Status: Inpatient   Certification Statement: The patient will continue to require additional inpatient hospital stay due to pnemonia    Discharge Plan / Estimated Discharge Date: once pneumonia improves    Code Status: Level 3 - DNAR and DNI      Subjective:   Patient seen and examined at bedside  Patient has no new complaints  Objective:     Vitals:   Temp (24hrs), Av 6 °F (37 °C), Min:98 4 °F (36 9 °C), Max:98 8 °F (37 1 °C)    Temp:  [98 4 °F (36 9 °C)-98 8 °F (37 1 °C)] 98 4 °F (36 9 °C)  HR:  [76-83] 76  Resp:  [18-20] 20  BP: (121-148)/(59-68) 148/68  SpO2:  [97 %-98 %] 98 %  Body mass index is 34 87 kg/m²  Input and Output Summary (last 24 hours):        Intake/Output Summary (Last 24 hours) at 18 1015 Shaun Dale filed at 12/21/18 0934   Gross per 24 hour   Intake                0 ml   Output                0 ml   Net                0 ml       Physical Exam:     Physical Exam   Constitutional: She appears well-developed and well-nourished  HENT:   Head: Normocephalic and atraumatic  Eyes: Pupils are equal, round, and reactive to light  Conjunctivae and EOM are normal    Neck: Normal range of motion  Neck supple  No JVD present  No tracheal deviation present  No thyromegaly present  Cardiovascular: Normal rate, regular rhythm and normal heart sounds  Exam reveals no gallop and no friction rub  No murmur heard  Pulmonary/Chest: Effort normal and breath sounds normal  No respiratory distress  She has no wheezes  She has no rales  Abdominal: Soft  Bowel sounds are normal  She exhibits no distension  There is no tenderness  There is no rebound  Musculoskeletal: Normal range of motion  She exhibits no edema  Neurological: She is alert  Skin: Skin is warm and dry  No rash noted  No erythema  Vitals reviewed  Additional Data:     Labs:      Results from last 7 days  Lab Units 12/21/18  0439   WBC Thousand/uL 3 96*   HEMOGLOBIN g/dL 10 1*   HEMATOCRIT % 32 1*   PLATELETS Thousands/uL 150   NEUTROS PCT % 74   LYMPHS PCT % 16   MONOS PCT % 7   EOS PCT % 1       Results from last 7 days  Lab Units 12/21/18  0439  12/19/18  1041   POTASSIUM mmol/L 3 9  < > 4 4   CHLORIDE mmol/L 104  < > 104   CO2 mmol/L 27  < > 30   BUN mg/dL 11  < > 13   CREATININE mg/dL 0 70  < > 0 97   CALCIUM mg/dL 8 0*  < > 8 7   ALK PHOS U/L  --   --  86   ALT U/L  --   --  18   AST U/L  --   --  13   < > = values in this interval not displayed  * I Have Reviewed All Lab Data Listed Above  * Additional Pertinent Lab Tests Reviewed:  All Labs For Current Hospital Admission Reviewed    Imaging:    Imaging Reports Reviewed Today Include:   Imaging Personally Reviewed by Myself Includes:      Recent Cultures (last 7 days):       Results from last 7 days  Lab Units 12/19/18  1647 12/19/18  1420 12/19/18  1419 12/19/18  1416   BLOOD CULTURE   --  No Growth at 24 hrs   --   --    GRAM STAIN RESULT   --   --  Gram positive cocci in clusters  --    INFLUENZA B PCR  None Detected  --   --   --    RSV PCR  None Detected  --   --   --    LEGIONELLA URINARY ANTIGEN   --   --   --  Negative       Last 24 Hours Medication List:     Current Facility-Administered Medications:  acetaminophen 488 mg Oral Q6H PRN Josué Hyde MD    albuterol 2 puff Inhalation Q4H PRN Josué Hyde MD    budesonide-formoterol 2 puff Inhalation BID Josué Hyde MD    cefepime 2,000 mg Intravenous Q12H Josué Hyde MD Last Rate: 2,000 mg (12/21/18 0300)   cholecalciferol 1,000 Units Oral Daily Josué Hyde MD    citalopram 10 mg Oral Daily Josué Hyde MD    cyanocobalamin 100 mcg Oral Daily Josué Hyde MD    donepezil 10 mg Oral HS Josué Hyde MD    enoxaparin 40 mg Subcutaneous Daily Josué Hyde MD    guaiFENesin 600 mg Oral Q12H [address] Josué Hyde MD    ondansetron 4 mg Intravenous Q6H PRN Josué Hyde MD    potassium chloride 10 mEq Oral Daily Josué Hyde MD    risperiDONE 0 5 mg Oral HS Josué Hyde MD    senna 1 tablet Oral HS PRN Josué Hyde MD    sodium chloride 75 mL/hr Intravenous Continuous Yassine Jordan PA-C Last Rate: 75 mL/hr (12/20/18 1213)   tiotropium 18 mcg Inhalation Daily Cathryn Hyde MD         Today, Patient Was Seen By: Brendon Brown MD    ** Please Note: This note has been constructed using a voice recognition system   **

## 2018-12-21 NOTE — SOCIAL WORK
CM called patient's daughter 3 times and left a message CM will wait for call back     CM dept will continue to follow until d/c

## 2018-12-21 NOTE — TREATMENT PLAN
Notified by nursing of 1/2 positive blood cultures  Gram-positive cocci in clusters  Urine strep antigen was positive      Patient appears to be clinically improving on cefepime, repeat procalcitonin level requested, continue with monotherapy with cefepime only, will follow up blood culture

## 2018-12-22 LAB
ANION GAP SERPL CALCULATED.3IONS-SCNC: 8 MMOL/L (ref 4–13)
BASOPHILS # BLD AUTO: 0.01 THOUSANDS/ΜL (ref 0–0.1)
BASOPHILS NFR BLD AUTO: 0 % (ref 0–1)
BUN SERPL-MCNC: 7 MG/DL (ref 5–25)
CALCIUM SERPL-MCNC: 8.2 MG/DL (ref 8.3–10.1)
CHLORIDE SERPL-SCNC: 105 MMOL/L (ref 100–108)
CO2 SERPL-SCNC: 28 MMOL/L (ref 21–32)
CREAT SERPL-MCNC: 0.76 MG/DL (ref 0.6–1.3)
EOSINOPHIL # BLD AUTO: 0.08 THOUSAND/ΜL (ref 0–0.61)
EOSINOPHIL NFR BLD AUTO: 3 % (ref 0–6)
ERYTHROCYTE [DISTWIDTH] IN BLOOD BY AUTOMATED COUNT: 14.2 % (ref 11.6–15.1)
GFR SERPL CREATININE-BSD FRML MDRD: 74 ML/MIN/1.73SQ M
GLUCOSE SERPL-MCNC: 93 MG/DL (ref 65–140)
HCT VFR BLD AUTO: 32.3 % (ref 34.8–46.1)
HGB BLD-MCNC: 10.3 G/DL (ref 11.5–15.4)
IMM GRANULOCYTES # BLD AUTO: 0.01 THOUSAND/UL (ref 0–0.2)
IMM GRANULOCYTES NFR BLD AUTO: 0 % (ref 0–2)
LYMPHOCYTES # BLD AUTO: 0.57 THOUSANDS/ΜL (ref 0.6–4.47)
LYMPHOCYTES NFR BLD AUTO: 18 % (ref 14–44)
MCH RBC QN AUTO: 29 PG (ref 26.8–34.3)
MCHC RBC AUTO-ENTMCNC: 31.9 G/DL (ref 31.4–37.4)
MCV RBC AUTO: 91 FL (ref 82–98)
MONOCYTES # BLD AUTO: 0.35 THOUSAND/ΜL (ref 0.17–1.22)
MONOCYTES NFR BLD AUTO: 11 % (ref 4–12)
NEUTROPHILS # BLD AUTO: 2.18 THOUSANDS/ΜL (ref 1.85–7.62)
NEUTS SEG NFR BLD AUTO: 68 % (ref 43–75)
NRBC BLD AUTO-RTO: 0 /100 WBCS
PLATELET # BLD AUTO: 177 THOUSANDS/UL (ref 149–390)
PMV BLD AUTO: 10.3 FL (ref 8.9–12.7)
POTASSIUM SERPL-SCNC: 4 MMOL/L (ref 3.5–5.3)
RBC # BLD AUTO: 3.55 MILLION/UL (ref 3.81–5.12)
SODIUM SERPL-SCNC: 141 MMOL/L (ref 136–145)
WBC # BLD AUTO: 3.2 THOUSAND/UL (ref 4.31–10.16)

## 2018-12-22 PROCEDURE — 85025 COMPLETE CBC W/AUTO DIFF WBC: CPT | Performed by: INTERNAL MEDICINE

## 2018-12-22 PROCEDURE — 99232 SBSQ HOSP IP/OBS MODERATE 35: CPT | Performed by: INTERNAL MEDICINE

## 2018-12-22 PROCEDURE — 80048 BASIC METABOLIC PNL TOTAL CA: CPT | Performed by: INTERNAL MEDICINE

## 2018-12-22 RX ADMIN — TIOTROPIUM BROMIDE 18 MCG: 18 CAPSULE ORAL; RESPIRATORY (INHALATION) at 09:17

## 2018-12-22 RX ADMIN — GUAIFENESIN 600 MG: 600 TABLET, EXTENDED RELEASE ORAL at 21:51

## 2018-12-22 RX ADMIN — BUDESONIDE AND FORMOTEROL FUMARATE DIHYDRATE 2 PUFF: 160; 4.5 AEROSOL RESPIRATORY (INHALATION) at 18:08

## 2018-12-22 RX ADMIN — SODIUM CHLORIDE 75 ML/HR: 0.9 INJECTION, SOLUTION INTRAVENOUS at 21:55

## 2018-12-22 RX ADMIN — CITALOPRAM HYDROBROMIDE 10 MG: 20 TABLET ORAL at 09:16

## 2018-12-22 RX ADMIN — SODIUM CHLORIDE 75 ML/HR: 0.9 INJECTION, SOLUTION INTRAVENOUS at 12:18

## 2018-12-22 RX ADMIN — VITAM B12 100 MCG: 100 TAB at 09:16

## 2018-12-22 RX ADMIN — ALBUTEROL SULFATE 2 PUFF: 90 AEROSOL, METERED RESPIRATORY (INHALATION) at 09:17

## 2018-12-22 RX ADMIN — VITAMIN D, TAB 1000IU (100/BT) 1000 UNITS: 25 TAB at 09:16

## 2018-12-22 RX ADMIN — CEFEPIME HYDROCHLORIDE 2000 MG: 2 INJECTION, POWDER, FOR SOLUTION INTRAVENOUS at 01:28

## 2018-12-22 RX ADMIN — CEFEPIME HYDROCHLORIDE 2000 MG: 2 INJECTION, POWDER, FOR SOLUTION INTRAVENOUS at 13:29

## 2018-12-22 RX ADMIN — ENOXAPARIN SODIUM 40 MG: 40 INJECTION SUBCUTANEOUS at 09:16

## 2018-12-22 RX ADMIN — POTASSIUM CHLORIDE 10 MEQ: 750 TABLET, EXTENDED RELEASE ORAL at 09:16

## 2018-12-22 RX ADMIN — BUDESONIDE AND FORMOTEROL FUMARATE DIHYDRATE 2 PUFF: 160; 4.5 AEROSOL RESPIRATORY (INHALATION) at 09:20

## 2018-12-22 RX ADMIN — GUAIFENESIN 600 MG: 600 TABLET, EXTENDED RELEASE ORAL at 09:16

## 2018-12-22 RX ADMIN — DONEPEZIL HYDROCHLORIDE 10 MG: 5 TABLET ORAL at 21:51

## 2018-12-22 RX ADMIN — RISPERIDONE 0.5 MG: 0.25 TABLET ORAL at 21:51

## 2018-12-22 NOTE — ASSESSMENT & PLAN NOTE
· Came from a personal living facility  · Present on admission, with cough noted in the emergency room and reports of shortness of breath in the facility  Chest x-ray revealed pneumonia  · In the emergency room, patient was given IV cefepime, IV vancomycin and IV azithromycin  · I will continue with IV cefepime  · Follow-up results of the blood cultures  · Will get sputum cultures  · Urine for  Positive for streptococcal antigen    · Mucinex

## 2018-12-22 NOTE — PROGRESS NOTES
Progress Note - Clovis Wilson 1940, [de-identified] y o  female MRN: [id number]    Unit/Bed#: -01 Encounter: [id number]    Primary Care Provider: ODILIA Miner   Date and time admitted to hospital: 2018  9:20 AM        * HCAP (healthcare-associated pneumonia)   Assessment & Plan    · Came from a personal living facility  · Present on admission, with cough noted in the emergency room and reports of shortness of breath in the facility  Chest x-ray revealed pneumonia  · In the emergency room, patient was given IV cefepime, IV vancomycin and IV azithromycin  · I will continue with IV cefepime  · Follow-up results of the blood cultures  · Will get sputum cultures  · Urine for  Positive for streptococcal antigen  · Mucinex     Dementia   Assessment & Plan    · Cont risperdal, aricept at home dosing  · Supportive care  VTE Pharmacologic Prophylaxis:   Pharmacologic: Enoxaparin (Lovenox)  Mechanical VTE Prophylaxis in Place: Yes    Patient Centered Rounds: I have performed bedside rounds with nursing staff today  Discussions with Specialists or Other Care Team Provider:      Education and Discussions with Family / Patient: bedside with patient, no family present    Time Spent for Care: 30 minutes  More than 50% of total time spent on counseling and coordination of care as described above      Current Length of Stay: 3 day(s)    Current Patient Status: Inpatient   Certification Statement: The patient will continue to require additional inpatient hospital stay due to treatment of pneumonia and awaiting blood and sputum culture results    Discharge Plan: d/c back to snf once ,medically stabilized    Code Status: Level 3 - DNAR and DNI      Subjective:   "I feel good" Patient is without c/o, denies cp/sob    Objective:     Vitals:   Temp (24hrs), Av 3 °F (36 8 °C), Min:97 9 °F (36 6 °C), Max:98 9 °F (37 2 °C)    Temp:  [97 9 °F (36 6 °C)-98 9 °F (37 2 °C)] 97 9 °F (36 6 °C)  HR:  Leanna Mercy 72  Resp:  [18] 18  BP: (126-159)/(60-72) 159/72  SpO2:  [96 %-97 %] 96 %  Body mass index is 34 87 kg/m²  Input and Output Summary (last 24 hours): Intake/Output Summary (Last 24 hours) at 12/22/18 1254  Last data filed at 12/22/18 0554   Gross per 24 hour   Intake                0 ml   Output             1280 ml   Net            -1280 ml       Physical Exam:     Physical Exam   Constitutional: She appears well-developed and well-nourished  No distress  HENT:   Head: Normocephalic and atraumatic  Right Ear: External ear normal    Left Ear: External ear normal    Nose: Nose normal    Mouth/Throat: Oropharynx is clear and moist  No oropharyngeal exudate  Eyes: Pupils are equal, round, and reactive to light  Conjunctivae and EOM are normal  Right eye exhibits no discharge  Left eye exhibits no discharge  No scleral icterus  Neck: Normal range of motion  Neck supple  No JVD present  No tracheal deviation present  No thyromegaly present  Cardiovascular: Normal rate, regular rhythm and intact distal pulses  Exam reveals no gallop and no friction rub  Murmur (mary 1/6) heard  Pulmonary/Chest: No stridor  No respiratory distress  She has no wheezes  She has rales (ronchi in right lung fields)  She exhibits tenderness  Abdominal: Soft  Bowel sounds are normal  She exhibits no distension  There is no tenderness  There is no rebound and no guarding  Musculoskeletal: Normal range of motion  She exhibits no edema, tenderness or deformity  Neurological: She is alert  She has normal reflexes  No cranial nerve deficit  She exhibits normal muscle tone  Coordination normal    Skin: Skin is warm and dry  No rash noted  She is not diaphoretic  No erythema  No pallor  Psychiatric: She has a normal mood and affect  Her behavior is normal  Judgment and thought content normal    Nursing note and vitals reviewed          Additional Data:     Labs:      Results from last 7 days  Lab Units 12/22/18  0503   WBC Thousand/uL 3 20*   HEMOGLOBIN g/dL 10 3*   HEMATOCRIT % 32 3*   PLATELETS Thousands/uL 177   NEUTROS PCT % 68   LYMPHS PCT % 18   MONOS PCT % 11   EOS PCT % 3       Results from last 7 days  Lab Units 12/22/18  0503  12/19/18  1041   SODIUM mmol/L 141  < > 143   POTASSIUM mmol/L 4 0  < > 4 4   CHLORIDE mmol/L 105  < > 104   CO2 mmol/L 28  < > 30   BUN mg/dL 7  < > 13   CREATININE mg/dL 0 76  < > 0 97   ANION GAP mmol/L 8  < > 9   CALCIUM mg/dL 8 2*  < > 8 7   ALBUMIN g/dL  --   --  2 9*   TOTAL BILIRUBIN mg/dL  --   --  0 80   ALK PHOS U/L  --   --  86   ALT U/L  --   --  18   AST U/L  --   --  13   GLUCOSE RANDOM mg/dL 93  < > 131   < > = values in this interval not displayed  Results from last 7 days  Lab Units 12/21/18  0649 12/19/18  1419   LACTIC ACID mmol/L  --  1 0   PROCALCITONIN ng/ml 1 79* 1 83*           * I Have Reviewed All Lab Data Listed Above  * Additional Pertinent Lab Tests Reviewed:  [address] [address] Admission Reviewed    Imaging:    Imaging Reports Reviewed Today Include:    Imaging Personally Reviewed by Myself Includes:       Recent Cultures (last 7 days):       Results from last 7 days  Lab Units 12/19/18  1647 12/19/18  1420 12/19/18  1419 12/19/18  1416   BLOOD CULTURE   --  No Growth at 48 hrs   --   --    GRAM STAIN RESULT   --   --  Gram positive cocci in clusters  --    INFLUENZA B PCR  None Detected  --   --   --    RSV PCR  None Detected  --   --   --    LEGIONELLA URINARY ANTIGEN   --   --   --  Negative       Last 24 Hours Medication List:     Current Facility-Administered Medications:  acetaminophen 488 mg Oral Q6H PRN Josué Hyde MD    albuterol 2 puff Inhalation Q4H PRN Josué Hyde MD    budesonide-formoterol 2 puff Inhalation BID Josué Hyde MD    cefepime 2,000 mg Intravenous Q12H Josué Hyde MD Last Rate: 2,000 mg (12/22/18 0128)   cholecalciferol 1,000 Units Oral Daily Josué Hyde MD    citalopram 10 mg Oral Daily Josué Hyde MD    cyanocobalamin 100 mcg Oral Daily Josué Hyde MD    donepezil 10 mg Oral HS Josué Hyde MD    enoxaparin 40 mg Subcutaneous Daily Josué Hyde MD    guaiFENesin 600 mg Oral Q12H [facility] Josué Hyde MD    ondansetron 4 mg Intravenous Q6H PRN Josué Hyde MD    potassium chloride 10 mEq Oral Daily Josué Hyde MD    risperiDONE 0 5 mg Oral HS Josué Hyde MD    senna 1 tablet Oral HS PRN Josué Hyde MD    sodium chloride 75 mL/hr Intravenous Continuous Asa Martinez PA-C Last Rate: 75 mL/hr (12/22/18 1218)   tiotropium 18 mcg Inhalation Daily Leonel Hyde MD         Today, Patient Was Seen By: Mio Rojas MD    ** Please Note: Dictation voice to text software may have been used in the creation of this document   **

## 2018-12-22 NOTE — PROGRESS NOTES
Daughter came to visit and was made aware that case management was trying to contact her  Called case management and voicemail left  Daughter's phone numbers updated in chart

## 2018-12-23 LAB
BACTERIA BLD CULT: ABNORMAL
GRAM STN SPEC: ABNORMAL

## 2018-12-23 PROCEDURE — 87040 BLOOD CULTURE FOR BACTERIA: CPT | Performed by: INTERNAL MEDICINE

## 2018-12-23 PROCEDURE — 99233 SBSQ HOSP IP/OBS HIGH 50: CPT | Performed by: INTERNAL MEDICINE

## 2018-12-23 RX ADMIN — DONEPEZIL HYDROCHLORIDE 10 MG: 5 TABLET ORAL at 21:39

## 2018-12-23 RX ADMIN — ENOXAPARIN SODIUM 40 MG: 40 INJECTION SUBCUTANEOUS at 08:04

## 2018-12-23 RX ADMIN — CEFTRIAXONE SODIUM 1000 MG: 10 INJECTION, POWDER, FOR SOLUTION INTRAVENOUS at 12:33

## 2018-12-23 RX ADMIN — CITALOPRAM HYDROBROMIDE 10 MG: 20 TABLET ORAL at 08:04

## 2018-12-23 RX ADMIN — BUDESONIDE AND FORMOTEROL FUMARATE DIHYDRATE 2 PUFF: 160; 4.5 AEROSOL RESPIRATORY (INHALATION) at 08:05

## 2018-12-23 RX ADMIN — TIOTROPIUM BROMIDE 18 MCG: 18 CAPSULE ORAL; RESPIRATORY (INHALATION) at 08:06

## 2018-12-23 RX ADMIN — VITAMIN D, TAB 1000IU (100/BT) 1000 UNITS: 25 TAB at 08:04

## 2018-12-23 RX ADMIN — GUAIFENESIN 600 MG: 600 TABLET, EXTENDED RELEASE ORAL at 08:04

## 2018-12-23 RX ADMIN — GUAIFENESIN 600 MG: 600 TABLET, EXTENDED RELEASE ORAL at 21:39

## 2018-12-23 RX ADMIN — SODIUM CHLORIDE 75 ML/HR: 0.9 INJECTION, SOLUTION INTRAVENOUS at 21:44

## 2018-12-23 RX ADMIN — CEFEPIME HYDROCHLORIDE 2000 MG: 2 INJECTION, POWDER, FOR SOLUTION INTRAVENOUS at 03:15

## 2018-12-23 RX ADMIN — RISPERIDONE 0.5 MG: 0.25 TABLET ORAL at 21:39

## 2018-12-23 RX ADMIN — SODIUM CHLORIDE 75 ML/HR: 0.9 INJECTION, SOLUTION INTRAVENOUS at 11:53

## 2018-12-23 RX ADMIN — VITAM B12 100 MCG: 100 TAB at 08:04

## 2018-12-23 RX ADMIN — BUDESONIDE AND FORMOTEROL FUMARATE DIHYDRATE 2 PUFF: 160; 4.5 AEROSOL RESPIRATORY (INHALATION) at 17:01

## 2018-12-23 RX ADMIN — POTASSIUM CHLORIDE 10 MEQ: 750 TABLET, EXTENDED RELEASE ORAL at 08:04

## 2018-12-23 NOTE — SOCIAL WORK
Per chart review pt's daughter, Jeanne Vasquez visited on 12/22 and phone numbers were updated however they are the same number CM Department has been calling  CM attempted to call pt's daughters and lm on both #'s provided

## 2018-12-23 NOTE — PROGRESS NOTES
Progress Note - Davian Ortega 1940, [de-identified] y o  female MRN: [id number]    Unit/Bed#: -01 Encounter: [id number]    Primary Care Provider: ODILIA Payne   Date and time admitted to hospital: 12/19/2018  9:20 AM        * HCAP (healthcare-associated pneumonia)   Assessment & Plan    · Came from a personal living facility  · Present on admission, with cough noted in the emergency room and reports of shortness of breath in the facility  Chest x-ray revealed pneumonia  · In the emergency room, patient was given IV cefepime, IV vancomycin and IV azithromycin  · Based on micro results, will d/c IV cefepime, start rocephin IV  · Urine for  Positive for streptococcal antigen  · Mucinex  · Blood cultures positive 1/2 bottles with coagulase-negative Staphylococcus  Likely contaminant, however will attempt to redraw blood culture to verify that the blood is Aseptic     COPD (chronic obstructive pulmonary disease) ([facility] [address] )   Assessment & Plan    · No exacerbation at this point  · Continue patient's nebulizations/inhalers  · According to the sign out, patient is on chronic oxygen at 2 L  Thus will continue with this  Dementia   Assessment & Plan    · Cont risperdal, aricept at home dosing  · Supportive care  VTE Pharmacologic Prophylaxis:   Pharmacologic: Enoxaparin (Lovenox)  Mechanical VTE Prophylaxis in Place: Yes    Patient Centered Rounds: I have performed bedside rounds with nursing staff today  Discussions with Specialists or Other Care Team Provider:      Education and Discussions with Family / Patient: at bedside    Time Spent for Care: 30 minutes  More than 50% of total time spent on counseling and coordination of care as described above      Current Length of Stay: 4 day(s)    Current Patient Status: Inpatient   Certification Statement: The patient will continue to require additional inpatient hospital stay due to positive blood culture    Discharge Plan:  Once medically stable the plan is to discharge patient back to skilled nursing facility she presented from    Code Status: Level 3 - DNAR and DNI      Subjective:   No acute events o/n, patient refused blood work this AM  She denies cp/sob, is without complaint this AM     Objective:     Vitals:   Temp (24hrs), Av 4 °F (36 9 °C), Min:98 2 °F (36 8 °C), Max:98 5 °F (36 9 °C)    Temp:  [98 2 °F (36 8 °C)-98 5 °F (36 9 °C)] 98 4 °F (36 9 °C)  HR:  [71-83] 79  Resp:  [16-18] 18  BP: (171-172)/(74-79) 171/74  SpO2:  [96 %-97 %] 97 %  Body mass index is 34 87 kg/m²  Input and Output Summary (last 24 hours): Intake/Output Summary (Last 24 hours) at 18 1136  Last data filed at 18 0536   Gross per 24 hour   Intake               60 ml   Output             1750 ml   Net            -1690 ml       Physical Exam:     Physical Exam   Constitutional: She appears well-developed and well-nourished  No distress  HENT:   Head: Normocephalic and atraumatic  Right Ear: External ear normal    Left Ear: External ear normal    Nose: Nose normal    Mouth/Throat: Oropharynx is clear and moist  No oropharyngeal exudate  Eyes: Pupils are equal, round, and reactive to light  Conjunctivae and EOM are normal  Right eye exhibits no discharge  Left eye exhibits no discharge  No scleral icterus  Neck: Normal range of motion  Neck supple  No JVD present  No tracheal deviation present  No thyromegaly present  Cardiovascular: Normal rate, regular rhythm, normal heart sounds and intact distal pulses  Exam reveals no gallop and no friction rub  No murmur heard  Pulmonary/Chest: Breath sounds normal  No stridor  No respiratory distress  She has no wheezes  She has no rales  She exhibits no tenderness  Abdominal: Soft  Bowel sounds are normal  She exhibits no distension and no mass  There is no tenderness  There is no rebound and no guarding  Musculoskeletal: Normal range of motion  She exhibits no edema, tenderness or deformity  Lymphadenopathy:     She has no cervical adenopathy  Neurological: She is alert  She has normal reflexes  No cranial nerve deficit  She exhibits normal muscle tone  Coordination normal    Skin: Skin is warm and dry  No rash noted  She is not diaphoretic  No erythema  No pallor  Psychiatric: She has a normal mood and affect  Her behavior is normal  Judgment and thought content normal    Nursing note and vitals reviewed  Additional Data:     Labs:      Results from last 7 days  Lab Units 12/22/18  0503   WBC Thousand/uL 3 20*   HEMOGLOBIN g/dL 10 3*   HEMATOCRIT % 32 3*   PLATELETS Thousands/uL 177   NEUTROS PCT % 68   LYMPHS PCT % 18   MONOS PCT % 11   EOS PCT % 3       Results from last 7 days  Lab Units 12/22/18  0503  12/19/18  1041   SODIUM mmol/L 141  < > 143   POTASSIUM mmol/L 4 0  < > 4 4   CHLORIDE mmol/L 105  < > 104   CO2 mmol/L 28  < > 30   BUN mg/dL 7  < > 13   CREATININE mg/dL 0 76  < > 0 97   ANION GAP mmol/L 8  < > 9   CALCIUM mg/dL 8 2*  < > 8 7   ALBUMIN g/dL  --   --  2 9*   TOTAL BILIRUBIN mg/dL  --   --  0 80   ALK PHOS U/L  --   --  86   ALT U/L  --   --  18   AST U/L  --   --  13   GLUCOSE RANDOM mg/dL 93  < > 131   < > = values in this interval not displayed  Results from last 7 days  Lab Units 12/21/18  0649 12/19/18  1419   LACTIC ACID mmol/L  --  1 0   PROCALCITONIN ng/ml 1 79* 1 83*           * I Have Reviewed All Lab Data Listed Above  * Additional Pertinent Lab Tests Reviewed: Deepika [facility] Admission Reviewed    Imaging:    Imaging Reports Reviewed Today Include:    Imaging Personally Reviewed by Myself Includes:       Recent Cultures (last 7 days):       Results from last 7 days  Lab Units 12/19/18  1647 12/19/18  1420 12/19/18  1419 12/19/18  1416   BLOOD CULTURE   --  No Growth at 72 hrs   Staphylococcus coagulase negative*  --    GRAM STAIN RESULT   --   --  Gram positive cocci in clusters  --    INFLUENZA B PCR  None Detected  --   --   -- RSV PCR  None Detected  --   --   --    LEGIONELLA URINARY ANTIGEN   --   --   --  Negative       Last 24 Hours Medication List:     Current Facility-Administered Medications:  acetaminophen 488 mg Oral Q6H PRN Josué Hyde MD    albuterol 2 puff Inhalation Q4H PRN Josué Hyde MD    budesonide-formoterol 2 puff Inhalation BID Jousé Hyde MD    cefTRIAXone 1,000 mg Intravenous Q24H Du Baugh MD    cholecalciferol 1,000 Units Oral Daily Josué Hyde MD    citalopram 10 mg Oral Daily Josué Hyde MD    cyanocobalamin 100 mcg Oral Daily Josué Hyde MD    donepezil 10 mg Oral HS Josué Hyde MD    enoxaparin 40 mg Subcutaneous Daily Josué Hyde MD    guaiFENesin 600 mg Oral Q12H [facility] Josué Hyde MD    ondansetron 4 mg Intravenous Q6H PRN Josué Hyde MD    potassium chloride 10 mEq Oral Daily Josué Hyde MD    risperiDONE 0 5 mg Oral HS Josué Hyde MD    senna 1 tablet Oral HS PRN Josué Hyde MD    sodium chloride 75 mL/hr Intravenous Continuous Mliss NIMA Molina Last Rate: 75 mL/hr (12/22/18 2155)   tiotropium 18 mcg Inhalation Daily Sonia Hyde MD         Today, Patient Was Seen By: Du Baugh MD    ** Please Note: Dictation voice to text software may have been used in the creation of this document   **

## 2018-12-23 NOTE — ASSESSMENT & PLAN NOTE
· Came from a personal living facility  · Present on admission, with cough noted in the emergency room and reports of shortness of breath in the facility  Chest x-ray revealed pneumonia  · In the emergency room, patient was given IV cefepime, IV vancomycin and IV azithromycin  · Based on micro results, will d/c IV cefepime, start rocephin IV  · Urine for  Positive for streptococcal antigen  · Mucinex  · Blood cultures positive 1/2 bottles with coagulase-negative Staphylococcus    Likely contaminant, however will attempt to redraw blood culture to verify that the blood is Aseptic

## 2018-12-23 NOTE — PLAN OF CARE
DISCHARGE PLANNING     Discharge to home or other facility with appropriate resources Progressing        DISCHARGE PLANNING - CARE MANAGEMENT     Discharge to post-acute care or home with appropriate resources Progressing        INFECTION - ADULT     Absence or prevention of progression during hospitalization Progressing        Knowledge Deficit     Patient/family/caregiver demonstrates understanding of disease process, treatment plan, medications, and discharge instructions Progressing        MUSCULOSKELETAL - ADULT     Maintain or return mobility to safest level of function Progressing        PAIN - ADULT     Verbalizes/displays adequate comfort level or baseline comfort level Progressing        Potential for Falls     Patient will remain free of falls Progressing        Prexisting or High Potential for Compromised Skin Integrity     Skin integrity is maintained or improved Progressing        SAFETY ADULT     Patient will remain free of falls Progressing

## 2018-12-23 NOTE — SOCIAL WORK
CM received cb from pt's daughter  Pt's daughter reports she is POA  Pt's daughter, Buzz Li reports pt is from [facility] and has been there for some time  Pt's daughter expressed concerns related to pt's care at [facility]  Pt's daughter said [facility] have spoken with her that they are unable to meet her needs  Pt's daughter understands pt requires higher LOC  Pt's daughter is in agreement to alternate placement and open to SNF  Pt's daughter reports she has been in contact with Vikash from the [facility] about placement in [place]  CM requested contact information and pt's daughter states she will cb with information  CM explained to pt's daughter that the [facility] has limited beds, wait lists, and is closed on weekends/holidays  CM further explained if pt is medically clear and a bed is not secured with the [facility] we would look for closest accepting facility in 50 mile radius  CM explained to pt's daughter the SNF can continue working with her and  transfer care to the [facility]  Pt's daughter understands and is in agreement to plan  CM sent updated SNF referrals  Pt's daughter to cb with [facility] Contact Information  CM reported CM Department will follow up Monday

## 2018-12-24 LAB — BACTERIA BLD CULT: NORMAL

## 2018-12-24 PROCEDURE — 99232 SBSQ HOSP IP/OBS MODERATE 35: CPT | Performed by: STUDENT IN AN ORGANIZED HEALTH CARE EDUCATION/TRAINING PROGRAM

## 2018-12-24 RX ADMIN — BUDESONIDE AND FORMOTEROL FUMARATE DIHYDRATE 2 PUFF: 160; 4.5 AEROSOL RESPIRATORY (INHALATION) at 08:50

## 2018-12-24 RX ADMIN — CITALOPRAM HYDROBROMIDE 10 MG: 20 TABLET ORAL at 08:50

## 2018-12-24 RX ADMIN — SODIUM CHLORIDE 75 ML/HR: 0.9 INJECTION, SOLUTION INTRAVENOUS at 11:08

## 2018-12-24 RX ADMIN — BUDESONIDE AND FORMOTEROL FUMARATE DIHYDRATE 2 PUFF: 160; 4.5 AEROSOL RESPIRATORY (INHALATION) at 17:39

## 2018-12-24 RX ADMIN — GUAIFENESIN 600 MG: 600 TABLET, EXTENDED RELEASE ORAL at 22:56

## 2018-12-24 RX ADMIN — POTASSIUM CHLORIDE 10 MEQ: 750 TABLET, EXTENDED RELEASE ORAL at 08:50

## 2018-12-24 RX ADMIN — SODIUM CHLORIDE 75 ML/HR: 0.9 INJECTION, SOLUTION INTRAVENOUS at 23:45

## 2018-12-24 RX ADMIN — GUAIFENESIN 600 MG: 600 TABLET, EXTENDED RELEASE ORAL at 08:50

## 2018-12-24 RX ADMIN — DONEPEZIL HYDROCHLORIDE 10 MG: 5 TABLET ORAL at 22:57

## 2018-12-24 RX ADMIN — CEFTRIAXONE SODIUM 1000 MG: 10 INJECTION, POWDER, FOR SOLUTION INTRAVENOUS at 11:41

## 2018-12-24 RX ADMIN — TIOTROPIUM BROMIDE 18 MCG: 18 CAPSULE ORAL; RESPIRATORY (INHALATION) at 08:50

## 2018-12-24 RX ADMIN — VITAMIN D, TAB 1000IU (100/BT) 1000 UNITS: 25 TAB at 08:50

## 2018-12-24 RX ADMIN — RISPERIDONE 0.5 MG: 0.25 TABLET ORAL at 22:57

## 2018-12-24 RX ADMIN — ENOXAPARIN SODIUM 40 MG: 40 INJECTION SUBCUTANEOUS at 08:49

## 2018-12-24 RX ADMIN — VITAM B12 100 MCG: 100 TAB at 08:50

## 2018-12-24 NOTE — ASSESSMENT & PLAN NOTE
· No exacerbation at this point  · Continue patient's nebulizations/inhalers  · According to the sign out, patient is on chronic oxygen at 2 L  Thus will continue with this  · PT recommends discharge to short-term rehab

## 2018-12-24 NOTE — SOCIAL WORK
Pt's daughter called back and provided [facility] Contact Information  Per Karuna Dunne contact is Gene [phone] and Marlane Seats [phone]  CM Department to follow up

## 2018-12-24 NOTE — SOCIAL WORK
Per RN pt requires continued IP stay as second set of blood cultures are pending  CM s/w pt's daughter Gucci Emanuel to follow up for Brigido Carson's information  Gucci Emanuel reported she does not have at this time and will cb  CM stressed importance of getting CM Department information if she would like to exhaust this option  Pt's daughter understands and will cb  CM reviewed if pt is medically clear plan would be to transition to first accepting facility in 48 mile radius with LT goal for SNF to assist with [facility] transition  Pt's daughter is aware and CM reviewed SNF choices available and willing to accept at this time  Pt's daughter is open to [facility] [place] and [facility]  CM offered to have liaison, Elisa Bello call with more information  Pt's daughter in agreement to s/w her  CM requested Elaine contact pt's daughter via [facility]  CM department to follow up

## 2018-12-24 NOTE — PROGRESS NOTES
Progress Note - Melina Butts 1940, [de-identified] y o  female MRN: [id number]    Unit/Bed#: -01 Encounter: [id number]    Primary Care Provider: ODILIA Henderson   Date and time admitted to hospital: 12/19/2018  9:20 AM        * HCAP (healthcare-associated pneumonia)   Assessment & Plan    · Came from a personal living facility  · Present on admission, with cough noted in the emergency room and reports of shortness of breath in the facility  Chest x-ray revealed pneumonia  · In the emergency room, patient was given IV cefepime, IV vancomycin and IV azithromycin  · Based on micro results, cefepime was DC , was started on Rocephin IV 12/23/2018  · Total IV antibiotics course since 12/20/2018  · Clinically significantly improved  · Blood cultures positive 1/2 bottles with coagulase-negative Staphylococcus  Likely contaminant, repeat cultures were sent yesterday awaiting result before then discharge  · PT recommends discharge to short-term rehab     Generalized weakness   Assessment & Plan    · PT recommended short-term rehab     COPD (chronic obstructive pulmonary disease) ([facility] [address] )   Assessment & Plan    · No exacerbation at this point  · Continue patient's nebulizations/inhalers  · According to the sign out, patient is on chronic oxygen at 2 L  Thus will continue with this  · PT recommends discharge to short-term rehab  Dementia   Assessment & Plan    · Cont risperdal, aricept at home dosing  · Supportive care  Encephalopathy   Assessment & Plan    · According to the ER sign out and report, patient had some alteration mental status in the facility  · Likely disease resolved as patient is presently answering questions appropriately and relatively oriented  Patient likely back at her baseline  Patient has baseline dementia  · Likely disease due to patient's infection/pneumonia  · Observe    · CT scan of the head was negative  · PT recommended short-term rehab       Anemia:  Presented with hemoglobin of 14  Was around 11-12 in  as per care everywhere  Currently hemoglobin stable around 10  Probably elevated at POA due to Hemo concentration  No signs of active bleeding noted  Hemodynamically stable  Follow-up iron panel  Monitor CBC  GI workup out pt if remains stable  VTE Pharmacologic Prophylaxis:   Pharmacologic: Enoxaparin (Lovenox)  Mechanical VTE Prophylaxis in Place: Yes    Patient Centered Rounds: I have performed bedside rounds with nursing staff today  Discussions with Specialists or Other Care Team Provider:     Education and Discussions with Family / Patient:  Discussed with patient  Family not present at bedside  Time Spent for Care: 30 minutes  More than 50% of total time spent on counseling and coordination of care as described above  Current Length of Stay: 5 day(s)    Current Patient Status: Inpatient   Certification Statement: The patient will continue to require additional inpatient hospital stay due to Awaiting repeat blood cultures    Discharge Plan:  Awaiting the blood cultures  Possible discharge within next 24-48 hours to short-term rehab  Code Status: Level 3 - DNAR and DNI      Subjective:   Not in acute distress  Awake, answering questions appropriate  Reports that she feels good  Denies chest pain, dyspnea, nausea, vomiting  No other events reported  Objective:     Vitals:   Temp (24hrs), Av 4 °F (36 9 °C), Min:97 6 °F (36 4 °C), Max:99 2 °F (37 3 °C)    Temp:  [97 6 °F (36 4 °C)-99 2 °F (37 3 °C)] 98 5 °F (36 9 °C)  HR:  [77-79] 78  Resp:  [18-20] 18  BP: (125-138)/(58-62) 138/62  SpO2:  [98 %-99 %] 98 %  Body mass index is 34 87 kg/m²  Input and Output Summary (last 24 hours):        Intake/Output Summary (Last 24 hours) at 18 1928  Last data filed at 18 1407   Gross per 24 hour   Intake              270 ml   Output              650 ml   Net             -380 ml       Physical Exam:     Physical Exam   Constitutional: No distress  HENT:   Head: Normocephalic and atraumatic  Eyes: Pupils are equal, round, and reactive to light  Neck: Normal range of motion  No JVD present  Cardiovascular: Normal rate  Pulmonary/Chest: Effort normal and breath sounds normal  No respiratory distress  Abdominal: Soft  Bowel sounds are normal  She exhibits no distension  Musculoskeletal: Normal range of motion  Neurological:   Awake, alert, answering questions appropriate  Psychiatric: She has a normal mood and affect  Additional Data:     Labs:      Results from last 7 days  Lab Units 12/22/18  0503   WBC Thousand/uL 3 20*   HEMOGLOBIN g/dL 10 3*   HEMATOCRIT % 32 3*   PLATELETS Thousands/uL 177   NEUTROS PCT % 68   LYMPHS PCT % 18   MONOS PCT % 11   EOS PCT % 3       Results from last 7 days  Lab Units 12/22/18  0503  12/19/18  1041   SODIUM mmol/L 141  < > 143   POTASSIUM mmol/L 4 0  < > 4 4   CHLORIDE mmol/L 105  < > 104   CO2 mmol/L 28  < > 30   BUN mg/dL 7  < > 13   CREATININE mg/dL 0 76  < > 0 97   ANION GAP mmol/L 8  < > 9   CALCIUM mg/dL 8 2*  < > 8 7   ALBUMIN g/dL  --   --  2 9*   TOTAL BILIRUBIN mg/dL  --   --  0 80   ALK PHOS U/L  --   --  86   ALT U/L  --   --  18   AST U/L  --   --  13   GLUCOSE RANDOM mg/dL 93  < > 131   < > = values in this interval not displayed  Results from last 7 days  Lab Units 12/21/18  0649 12/19/18  1419   LACTIC ACID mmol/L  --  1 0   PROCALCITONIN ng/ml 1 79* 1 83*           * I Have Reviewed All Lab Data Listed Above  * Additional Pertinent Lab Tests Reviewed: All Labs Within Last 24 Hours Reviewed      Recent Cultures (last 7 days):       Results from last 7 days  Lab Units 12/19/18  1647 12/19/18  1420 12/19/18  1419 12/19/18  1416   BLOOD CULTURE   --  No Growth After 5 Days   Staphylococcus coagulase negative*  --    GRAM STAIN RESULT   --   --  Gram positive cocci in clusters  --    INFLUENZA B PCR  None Detected  --   --   --    RSV PCR  None Detected  --   -- --    LEGIONELLA URINARY ANTIGEN   --   --   --  Negative       Last 24 Hours Medication List:     Current Facility-Administered Medications:  acetaminophen 488 mg Oral Q6H PRN Josué Hyde MD    albuterol 2 puff Inhalation Q4H PRN Josué Hyde MD    budesonide-formoterol 2 puff Inhalation BID Josué Hyde MD    cefTRIAXone 1,000 mg Intravenous Q24H Vandana Wheeler MD Last Rate: 1,000 mg (12/24/18 1141)   cholecalciferol 1,000 Units Oral Daily Josué Hyde MD    citalopram 10 mg Oral Daily Josué Hyde MD    cyanocobalamin 100 mcg Oral Daily Josué Hyde MD    donepezil 10 mg Oral HS Josué Hyde MD    enoxaparin 40 mg Subcutaneous Daily Josué Hyde MD    guaiFENesin 600 mg Oral Q12H [address] Josué Hyde MD    ondansetron 4 mg Intravenous Q6H PRN Josué Hyde MD    potassium chloride 10 mEq Oral Daily Josué Hyde MD    risperiDONE 0 5 mg Oral HS Josué Hyde MD    senna 1 tablet Oral HS PRN Josué Hyde MD    sodium chloride 75 mL/hr Intravenous Continuous Jaclyn Rodriguez PA-C Last Rate: 75 mL/hr (12/24/18 1108)   tiotropium 18 mcg Inhalation Daily Marii Hyde MD         Today, Patient Was Seen By: Yung Frazier MD    ** Please Note: Dictation voice to text software may have been used in the creation of this document   **

## 2018-12-24 NOTE — ASSESSMENT & PLAN NOTE
· According to the ER sign out and report, patient had some alteration mental status in the facility  · Likely disease resolved as patient is presently answering questions appropriately and relatively oriented  Patient likely back at her baseline  Patient has baseline dementia  · Likely disease due to patient's infection/pneumonia  · Observe    · CT scan of the head was negative  · PT recommended short-term rehab

## 2018-12-24 NOTE — ASSESSMENT & PLAN NOTE
· Came from a personal living facility  · Present on admission, with cough noted in the emergency room and reports of shortness of breath in the facility  Chest x-ray revealed pneumonia  · In the emergency room, patient was given IV cefepime, IV vancomycin and IV azithromycin  · Based on micro results, cefepime was DC , was started on Rocephin IV 12/23/2018  · Total IV antibiotics course since 12/20/2018  · Clinically significantly improved  · Blood cultures positive 1/2 bottles with coagulase-negative Staphylococcus  Likely contaminant, repeat cultures were sent yesterday awaiting result before then discharge    · PT recommends discharge to short-term rehab

## 2018-12-24 NOTE — UTILIZATION REVIEW
Continued Stay Review    Date: 12/23    Vital Signs: Temp:  [98 2 °F (36 8 °C)-98 5 °F (36 9 °C)] 98 4 °F (36 9 °C)  HR:  [71-83] 79  Resp:  [16-18] 18  BP: (171-172)/(74-79) 171/74  SpO2:  [96 %-97 %] 97 %  Body mass index is 34 87 kg/m²         Medications:   Scheduled Meds:   Current Facility-Administered Medications:  acetaminophen 488 mg Oral Q6H PRN     albuterol 2 puff Inhalation Q4H PRN     budesonide-formoterol 2 puff Inhalation BID     cefTRIAXone 1,000 mg Intravenous Q24H  Last Rate: 1,000 mg (12/23/18 1233)   cholecalciferol 1,000 Units Oral Daily     citalopram 10 mg Oral Daily     cyanocobalamin 100 mcg Oral Daily     donepezil 10 mg Oral HS     enoxaparin 40 mg Subcutaneous Daily     guaiFENesin 600 mg Oral Q12H HARRISON     ondansetron 4 mg Intravenous Q6H PRN     potassium chloride 10 mEq Oral Daily     risperiDONE 0 5 mg Oral HS     senna 1 tablet Oral HS PRN     sodium chloride 75 mL/hr Intravenous Continuous  Last Rate: 75 mL/hr (12/24/18 1108)   tiotropium 18 mcg Inhalation Daily         Abnormal Labs/Diagnostic Results: none     Age/Sex: [de-identified] y o  female     Assessment/Plan:   * HCAP (healthcare-associated pneumonia)   Assessment & Plan     · Came from a personal living facility  · Present on admission, with cough noted in the emergency room and reports of shortness of breath in the facility  Chest x-ray revealed pneumonia  · In the emergency room, patient was given IV cefepime, IV vancomycin and IV azithromycin  · Based on micro results, will d/c IV cefepime, start rocephin IV  · Urine for  Positive for streptococcal antigen  · Mucinex  · Blood cultures positive 1/2 bottles with coagulase-negative Staphylococcus  Likely contaminant, however will attempt to redraw blood culture to verify that the blood is Aseptic      COPD (chronic obstructive pulmonary disease) (HCC)   Assessment & Plan     · No exacerbation at this point  · Continue patient's nebulizations/inhalers    · According to the sign out, patient is on chronic oxygen at 2 L  Thus will continue with this       Dementia   Assessment & Plan     · Cont risperdal, aricept at home dosing  · Supportive care          VTE Pharmacologic Prophylaxis:   Pharmacologic: Enoxaparin (Lovenox)  Mechanical VTE Prophylaxis in Place: Yes     Patient Centered Rounds: I have performed bedside rounds with nursing staff today        Discharge Plan:  TBD     VERONICA working on placement options

## 2018-12-25 PROCEDURE — 99232 SBSQ HOSP IP/OBS MODERATE 35: CPT | Performed by: GENERAL PRACTICE

## 2018-12-25 RX ADMIN — BUDESONIDE AND FORMOTEROL FUMARATE DIHYDRATE 2 PUFF: 160; 4.5 AEROSOL RESPIRATORY (INHALATION) at 11:19

## 2018-12-25 RX ADMIN — VITAM B12 100 MCG: 100 TAB at 11:16

## 2018-12-25 RX ADMIN — POTASSIUM CHLORIDE 10 MEQ: 750 TABLET, EXTENDED RELEASE ORAL at 11:16

## 2018-12-25 RX ADMIN — DONEPEZIL HYDROCHLORIDE 10 MG: 5 TABLET ORAL at 21:13

## 2018-12-25 RX ADMIN — ENOXAPARIN SODIUM 40 MG: 40 INJECTION SUBCUTANEOUS at 11:18

## 2018-12-25 RX ADMIN — TIOTROPIUM BROMIDE 18 MCG: 18 CAPSULE ORAL; RESPIRATORY (INHALATION) at 11:19

## 2018-12-25 RX ADMIN — CITALOPRAM HYDROBROMIDE 10 MG: 20 TABLET ORAL at 11:17

## 2018-12-25 RX ADMIN — VITAMIN D, TAB 1000IU (100/BT) 1000 UNITS: 25 TAB at 11:16

## 2018-12-25 RX ADMIN — RISPERIDONE 0.5 MG: 0.25 TABLET ORAL at 21:12

## 2018-12-25 RX ADMIN — GUAIFENESIN 600 MG: 600 TABLET, EXTENDED RELEASE ORAL at 21:13

## 2018-12-25 RX ADMIN — GUAIFENESIN 600 MG: 600 TABLET, EXTENDED RELEASE ORAL at 11:16

## 2018-12-25 RX ADMIN — BUDESONIDE AND FORMOTEROL FUMARATE DIHYDRATE 2 PUFF: 160; 4.5 AEROSOL RESPIRATORY (INHALATION) at 17:25

## 2018-12-25 NOTE — PROGRESS NOTES
Progress Note - Eliane Varghese 1940, [de-identified] y o  female MRN: [id number]    Unit/Bed#: -01 Encounter: [id number]    Primary Care Provider: ODILIA Ivy   Date and time admitted to hospital: 12/19/2018  9:20 AM      She Is refusing labs      Generalized weakness   Assessment & Plan    · PT recommended short-term rehab     COPD (chronic obstructive pulmonary disease) ([facility] [address] )   Assessment & Plan    · No exacerbation at this point  · Continue patient's nebulizations/inhalers  · According to the sign out, patient is on chronic oxygen at 2 L  Thus will continue with this  · PT recommends discharge to short-term rehab  Dementia   Assessment & Plan    · Cont risperdal, aricept at home dosing  · Supportive care  Encephalopathy   Assessment & Plan    · According to the ER sign out and report, patient had some alteration mental status in the facility  · Likely disease resolved as patient is presently answering questions appropriately and relatively oriented  Patient likely back at her baseline  Patient has baseline dementia  · Likely disease due to patient's infection/pneumonia  · Observe  · CT scan of the head was negative  · PT recommended short-term rehab     * HCAP (healthcare-associated pneumonia)   Assessment & Plan    · Came from a personal living facility  · Present on admission, with cough noted in the emergency room and reports of shortness of breath in the facility  Chest x-ray revealed pneumonia  · In the emergency room, patient was given IV cefepime, IV vancomycin and IV azithromycin  · Based on micro results, cefepime was DC , was started on Rocephin IV 12/23/2018  · Total IV antibiotics course since 12/20/2018  · Clinically significantly improved  · Blood cultures positive 1/2 bottles with coagulase-negative Staphylococcus  Likely contaminant, repeat cultures were sent yesterday awaiting result before then discharge    · PT recommends discharge to short-term rehab VTE Pharmacologic Prophylaxis:   Pharmacologic: Enoxaparin (Lovenox)  Mechanical VTE Prophylaxis in Place: Yes    Patient Centered Rounds: I have performed bedside rounds with nursing staff today  Discussions with Specialists or Other Care Team Provider:     Education and Discussions with Family / Patient:     Time Spent for Care: 30 minutes  More than 50% of total time spent on counseling and coordination of care as described above  Current Length of Stay: 6 day(s)    Current Patient Status: Inpatient   Certification Statement: The patient will continue to require additional inpatient hospital stay due to STR    Discharge Plan: str    Code Status: Level 3 - DNAR and DNI      Subjective:   No c/o     Objective:     Vitals:   Temp (24hrs), Av 7 °F (37 1 °C), Min:98 5 °F (36 9 °C), Max:98 8 °F (37 1 °C)    Temp:  [98 5 °F (36 9 °C)-98 8 °F (37 1 °C)] 98 8 °F (37 1 °C)  HR:  [78-79] 79  Resp:  [18-19] 19  BP: (138-147)/(62-83) 147/83  SpO2:  [94 %-98 %] 94 %  Body mass index is 34 87 kg/m²  Input and Output Summary (last 24 hours): Intake/Output Summary (Last 24 hours) at 18 1043  Last data filed at 18 9085   Gross per 24 hour   Intake             1125 ml   Output             1950 ml   Net             -825 ml       Physical Exam:     Physical Exam   Constitutional: She appears well-developed and well-nourished  HENT:   Head: Normocephalic  Eyes: Pupils are equal, round, and reactive to light  Cardiovascular: Normal rate and regular rhythm  Pulmonary/Chest: Effort normal and breath sounds normal    Abdominal: Soft  Musculoskeletal: She exhibits edema     Trace lower ext edema         Additional Data:     Labs:      Results from last 7 days  Lab Units 18  0503   WBC Thousand/uL 3 20*   HEMOGLOBIN g/dL 10 3*   HEMATOCRIT % 32 3*   PLATELETS Thousands/uL 177   NEUTROS PCT % 68   LYMPHS PCT % 18   MONOS PCT % 11   EOS PCT % 3       Results from last 7 days  Lab Units 12/22/18  0503  12/19/18  1041   SODIUM mmol/L 141  < > 143   POTASSIUM mmol/L 4 0  < > 4 4   CHLORIDE mmol/L 105  < > 104   CO2 mmol/L 28  < > 30   BUN mg/dL 7  < > 13   CREATININE mg/dL 0 76  < > 0 97   ANION GAP mmol/L 8  < > 9   CALCIUM mg/dL 8 2*  < > 8 7   ALBUMIN g/dL  --   --  2 9*   TOTAL BILIRUBIN mg/dL  --   --  0 80   ALK PHOS U/L  --   --  86   ALT U/L  --   --  18   AST U/L  --   --  13   GLUCOSE RANDOM mg/dL 93  < > 131   < > = values in this interval not displayed  Results from last 7 days  Lab Units 12/21/18  0649 12/19/18  1419   LACTIC ACID mmol/L  --  1 0   PROCALCITONIN ng/ml 1 79* 1 83*           * I Have Reviewed All Lab Data Listed Above  * Additional Pertinent Lab Tests Reviewed: All Labs Within Last 24 Hours Reviewed    Imaging:    Imaging Reports Reviewed Today Include:   Imaging Personally Reviewed by Myself Includes:      Recent Cultures (last 7 days):       Results from last 7 days  Lab Units 12/23/18  1230 12/19/18  1647 12/19/18  1420 12/19/18  1419 12/19/18  1416   BLOOD CULTURE  No Growth at 24 hrs   --  No Growth After 5 Days   Staphylococcus coagulase negative*  --    GRAM STAIN RESULT   --   --   --  Gram positive cocci in clusters  --    INFLUENZA B PCR   --  None Detected  --   --   --    RSV PCR   --  None Detected  --   --   --    LEGIONELLA URINARY ANTIGEN   --   --   --   --  Negative       Last 24 Hours Medication List:     Current Facility-Administered Medications:  acetaminophen 488 mg Oral Q6H PRN Josué Hyde MD    albuterol 2 puff Inhalation Q4H PRN Josué Hyde MD    budesonide-formoterol 2 puff Inhalation BID Josué Hyde MD    cefTRIAXone 1,000 mg Intravenous Q24H Sophy Rowland MD Last Rate: 1,000 mg (12/24/18 1141)   cholecalciferol 1,000 Units Oral Daily Josué Hyde MD    citalopram 10 mg Oral Daily Josué Hyde MD    cyanocobalamin 100 mcg Oral Daily Josué Hyde MD    donepezil 10 mg Oral HS Jesse Del Rosario MD    enoxaparin 40 mg Subcutaneous Daily Josué Hyde MD    guaiFENesin 600 mg Oral Q12H [address] Josué Hyde MD    ondansetron 4 mg Intravenous Q6H PRN Josué Hyde MD    potassium chloride 10 mEq Oral Daily Josué Hyde MD    risperiDONE 0 5 mg Oral HS Josué Hyde MD    senna 1 tablet Oral HS PRN Josué Hyde MD    tiotropium 18 mcg Inhalation Daily Jesse Del Rosario MD         Today, Patient Was Seen By: Zain Hassan MD    ** Please Note: Dictation voice to text software may have been used in the creation of this document   **

## 2018-12-25 NOTE — PLAN OF CARE
Problem: PAIN - ADULT  Goal: Verbalizes/displays adequate comfort level or baseline comfort level  Interventions:  - Encourage patient to monitor pain and request assistance  - Assess pain using appropriate pain scale  - Administer analgesics based on type and severity of pain and evaluate response  - Implement non-pharmacological measures as appropriate and evaluate response  - Consider cultural and social influences on pain and pain management  - Notify physician/advanced practitioner if interventions unsuccessful or patient reports new pain   Outcome: Progressing      Problem: INFECTION - ADULT  Goal: Absence or prevention of progression during hospitalization  INTERVENTIONS:  - Assess and monitor for signs and symptoms of infection  - Monitor lab/diagnostic results  - Monitor all insertion sites, i e  indwelling lines, tubes, and drains  - Monitor endotracheal (as able) and nasal secretions for changes in amount and color  - [place] appropriate cooling/warming therapies per order  - Administer medications as ordered  - Instruct and encourage patient and family to use good hand hygiene technique  - Identify and instruct in appropriate isolation precautions for identified infection/condition   Outcome: Progressing      Problem: SAFETY ADULT  Goal: Patient will remain free of falls  INTERVENTIONS:  - Assess patient frequently for physical needs  -  Identify cognitive and physical deficits and behaviors that affect risk of falls    -  [place] fall precautions as indicated by assessment   - Educate patient/family on patient safety including physical limitations  - Instruct patient to call for assistance with activity based on assessment  - Modify environment to reduce risk of injury  - Consider OT/PT consult to assist with strengthening/mobility    Outcome: Progressing      Problem: DISCHARGE PLANNING  Goal: Discharge to home or other facility with appropriate resources  INTERVENTIONS:  - Identify barriers to discharge w/patient and caregiver  - Arrange for needed discharge resources and transportation as appropriate  - Identify discharge learning needs (meds, wound care, etc )  - Arrange for interpretive services to assist at discharge as needed  - Refer to Case Management Department for coordinating discharge planning if the patient needs post-hospital services based on physician/advanced practitioner order or complex needs related to functional status, cognitive ability, or social support system   Outcome: Progressing      Problem: Knowledge Deficit  Goal: Patient/family/caregiver demonstrates understanding of disease process, treatment plan, medications, and discharge instructions  Complete learning assessment and assess knowledge base    Interventions:  - Provide teaching at level of understanding  - Provide teaching via preferred learning methods   Outcome: Progressing      Problem: MUSCULOSKELETAL - ADULT  Goal: Maintain or return mobility to safest level of function  INTERVENTIONS:  - Assess patient's ability to carry out ADLs; assess patient's baseline for ADL function and identify physical deficits which impact ability to perform ADLs (bathing, care of mouth/teeth, toileting, grooming, dressing, etc )  - Assess/evaluate cause of self-care deficits   - Assess range of motion  - Assess patient's mobility; develop plan if impaired  - Assess patient's need for assistive devices and provide as appropriate  - Encourage maximum independence but intervene and supervise when necessary  - Involve family in performance of ADLs  - Assess for home care needs following discharge   - Request OT consult to assist with ADL evaluation and planning for discharge  - Provide patient education as appropriate   Outcome: Progressing      Problem: Potential for Falls  Goal: Patient will remain free of falls  INTERVENTIONS:  - Assess patient frequently for physical needs  -  Identify cognitive and physical deficits and behaviors that affect risk of falls    -  [place] fall precautions as indicated by assessment   - Educate patient/family on patient safety including physical limitations  - Instruct patient to call for assistance with activity based on assessment  - Modify environment to reduce risk of injury  - Consider OT/PT consult to assist with strengthening/mobility    Outcome: Progressing      Problem: Prexisting or High Potential for Compromised Skin Integrity  Goal: Skin integrity is maintained or improved  INTERVENTIONS:  - Identify patients at risk for skin breakdown  - Assess and monitor skin integrity  - Assess and monitor nutrition and hydration status  - Monitor labs (i e  albumin)  - Assess for incontinence   - Turn and reposition patient  - Assist with mobility/ambulation  - Relieve pressure over bony prominences  - Avoid friction and shearing  - Provide appropriate hygiene as needed including keeping skin clean and dry  - Evaluate need for skin moisturizer/barrier cream  - Collaborate with interdisciplinary team (i e  Nutrition, Rehabilitation, etc )   - Patient/family teaching   Outcome: Progressing      Problem: DISCHARGE PLANNING - CARE MANAGEMENT  Goal: Discharge to post-acute care or home with appropriate resources  INTERVENTIONS:  - Conduct assessment to determine patient/family and health care team treatment goals, and need for post-acute services based on payer coverage, community resources, and patient preferences, and barriers to discharge  - Address psychosocial, clinical, and financial barriers to discharge as identified in assessment in conjunction with the patient/family and health care team  - Arrange appropriate level of post-acute services according to patients   needs and preference and payer coverage in collaboration with the physician and health care team  - Communicate with and update the patient/family, physician, and health care team regarding progress on the discharge plan  - Arrange appropriate transportation to post-acute venues   Outcome: Progressing

## 2018-12-26 PROBLEM — G93.40 ENCEPHALOPATHY: Status: RESOLVED | Noted: 2018-12-19 | Resolved: 2018-12-26

## 2018-12-26 PROBLEM — J18.9 HCAP (HEALTHCARE-ASSOCIATED PNEUMONIA): Status: RESOLVED | Noted: 2018-12-19 | Resolved: 2018-12-26

## 2018-12-26 PROCEDURE — 99239 HOSP IP/OBS DSCHRG MGMT >30: CPT | Performed by: GENERAL PRACTICE

## 2018-12-26 RX ORDER — GUAIFENESIN 600 MG
600 TABLET, EXTENDED RELEASE 12 HR ORAL EVERY 12 HOURS SCHEDULED
Qty: 20 TABLET | Refills: 0 | Status: SHIPPED | OUTPATIENT
Start: 2018-12-26

## 2018-12-26 RX ORDER — CEFDINIR 300 MG/1
300 CAPSULE ORAL EVERY 12 HOURS SCHEDULED
Qty: 6 CAPSULE | Refills: 0 | Status: SHIPPED | OUTPATIENT
Start: 2018-12-26 | End: 2018-12-28

## 2018-12-26 RX ORDER — CEFDINIR 300 MG/1
300 CAPSULE ORAL EVERY 12 HOURS SCHEDULED
Status: DISCONTINUED | OUTPATIENT
Start: 2018-12-26 | End: 2018-12-28 | Stop reason: HOSPADM

## 2018-12-26 RX ADMIN — VITAM B12 100 MCG: 100 TAB at 09:17

## 2018-12-26 RX ADMIN — CITALOPRAM HYDROBROMIDE 10 MG: 20 TABLET ORAL at 09:18

## 2018-12-26 RX ADMIN — GUAIFENESIN 600 MG: 600 TABLET, EXTENDED RELEASE ORAL at 09:17

## 2018-12-26 RX ADMIN — DONEPEZIL HYDROCHLORIDE 10 MG: 5 TABLET ORAL at 21:35

## 2018-12-26 RX ADMIN — BUDESONIDE AND FORMOTEROL FUMARATE DIHYDRATE 2 PUFF: 160; 4.5 AEROSOL RESPIRATORY (INHALATION) at 09:19

## 2018-12-26 RX ADMIN — RISPERIDONE 0.5 MG: 0.25 TABLET ORAL at 21:34

## 2018-12-26 RX ADMIN — TIOTROPIUM BROMIDE 18 MCG: 18 CAPSULE ORAL; RESPIRATORY (INHALATION) at 09:19

## 2018-12-26 RX ADMIN — GUAIFENESIN 600 MG: 600 TABLET, EXTENDED RELEASE ORAL at 21:34

## 2018-12-26 RX ADMIN — CEFDINIR 300 MG: 300 CAPSULE ORAL at 21:34

## 2018-12-26 RX ADMIN — POTASSIUM CHLORIDE 10 MEQ: 750 TABLET, EXTENDED RELEASE ORAL at 09:17

## 2018-12-26 RX ADMIN — VITAMIN D, TAB 1000IU (100/BT) 1000 UNITS: 25 TAB at 09:17

## 2018-12-26 RX ADMIN — ENOXAPARIN SODIUM 40 MG: 40 INJECTION SUBCUTANEOUS at 09:18

## 2018-12-26 RX ADMIN — CEFDINIR 300 MG: 300 CAPSULE ORAL at 12:21

## 2018-12-26 NOTE — SOCIAL WORK
Per MD pt medically cleared to go to [facility] today  Transport tentatively in place for 4:30 PM  Discussed transport time with Zeeshan at [place] [facility] [place] who is agreeable  VMM left for pt's daughter Charlette Singh to discuss, awaiting call back  CM will continue to follow

## 2018-12-26 NOTE — SOCIAL WORK
CM placed call [place] contacts provided by pt's daughter - Gene (G#[phone]) [facility] left  Attempted to call Polly Hale (S#[phone]) unable to leave [facility] message

## 2018-12-26 NOTE — SOCIAL WORK
CM spoke with Gene with the [facility] with background regarding pt and pt's daughter's desire to have pt go to [facility] home if possible  Gene requests to know if pt is a  or spouse  He reports if pt is  the soonest pt could get into a home would likely be 1-2 weeks following submission of applications (includes [place] discharge, medical assessment, and activities of daily living)  Gene reports closest home is the [facility] in [place], [place]  If pt is a spouse she will have to go on a wait list which will be a longer process and shortest wait list at this time is at the [facility]  Gene reports he will email copy of applications to CM and application is first step in process  CM spoke with Zeeshan at [facility] who reports they were able to offer bed for pt at [place] and that daughter had planned on coming by to complete paperwork but they had not seen her stop by yet  CM discussed above information with pt's daughter Jeanne Vasquez via telephone  Jeanne Vasquez reports she is agreeable to pt going to [facility] at [facility] with plan to either continue SNF level of care and apply for [facility] home or return to [facility] if condition improves  Pt reports when she spoke to [facility] they emailed her a copy of paperwork they need completed but she was unable to print so they said she could complete upon her mother's arrival to facility  Discussed with Jeanne Vasquez that CM will discuss with MD but pt may be medically cleared as soon as today  Discussed that application for [facility] home will need to be completed ASAP if that is the route she chooses and CM left copy of application at pt's bedside  She reports she doesn't have her fathers  paperwork but Alden Rivera has copies of information  Pt's daughter wishes to discuss with Alden Rivera as he completed previous financial application for [facility] - discussed that CM was unable to get in touch with Alden Rivera but did discuss with Gene at [facility]   Daughter will follow up with him independently  Discussed with RN and SLIM that pt's daughter is agreeable to [facility] at [place]  Transport tentatively arranged by Maria Dolores Mata at [facility] for 4:30 PM to [facility]

## 2018-12-26 NOTE — PLAN OF CARE
Problem: PAIN - ADULT  Goal: Verbalizes/displays adequate comfort level or baseline comfort level  Interventions:  - Encourage patient to monitor pain and request assistance  - Assess pain using appropriate pain scale  - Administer analgesics based on type and severity of pain and evaluate response  - Implement non-pharmacological measures as appropriate and evaluate response  - Consider cultural and social influences on pain and pain management  - Notify physician/advanced practitioner if interventions unsuccessful or patient reports new pain   Outcome: Progressing      Problem: INFECTION - ADULT  Goal: Absence or prevention of progression during hospitalization  INTERVENTIONS:  - Assess and monitor for signs and symptoms of infection  - Monitor lab/diagnostic results  - Monitor all insertion sites, i e  indwelling lines, tubes, and drains  - Monitor endotracheal (as able) and nasal secretions for changes in amount and color  - [place] appropriate cooling/warming therapies per order  - Administer medications as ordered  - Instruct and encourage patient and family to use good hand hygiene technique  - Identify and instruct in appropriate isolation precautions for identified infection/condition   Outcome: Progressing      Problem: SAFETY ADULT  Goal: Patient will remain free of falls  INTERVENTIONS:  - Assess patient frequently for physical needs  -  Identify cognitive and physical deficits and behaviors that affect risk of falls    -  [place] fall precautions as indicated by assessment   - Educate patient/family on patient safety including physical limitations  - Instruct patient to call for assistance with activity based on assessment  - Modify environment to reduce risk of injury  - Consider OT/PT consult to assist with strengthening/mobility    Outcome: Progressing      Problem: DISCHARGE PLANNING  Goal: Discharge to home or other facility with appropriate resources  INTERVENTIONS:  - Identify barriers to discharge w/patient and caregiver  - Arrange for needed discharge resources and transportation as appropriate  - Identify discharge learning needs (meds, wound care, etc )  - Arrange for interpretive services to assist at discharge as needed  - Refer to Case Management Department for coordinating discharge planning if the patient needs post-hospital services based on physician/advanced practitioner order or complex needs related to functional status, cognitive ability, or social support system   Outcome: Progressing      Problem: Knowledge Deficit  Goal: Patient/family/caregiver demonstrates understanding of disease process, treatment plan, medications, and discharge instructions  Complete learning assessment and assess knowledge base    Interventions:  - Provide teaching at level of understanding  - Provide teaching via preferred learning methods   Outcome: Progressing      Problem: MUSCULOSKELETAL - ADULT  Goal: Maintain or return mobility to safest level of function  INTERVENTIONS:  - Assess patient's ability to carry out ADLs; assess patient's baseline for ADL function and identify physical deficits which impact ability to perform ADLs (bathing, care of mouth/teeth, toileting, grooming, dressing, etc )  - Assess/evaluate cause of self-care deficits   - Assess range of motion  - Assess patient's mobility; develop plan if impaired  - Assess patient's need for assistive devices and provide as appropriate  - Encourage maximum independence but intervene and supervise when necessary  - Involve family in performance of ADLs  - Assess for home care needs following discharge   - Request OT consult to assist with ADL evaluation and planning for discharge  - Provide patient education as appropriate   Outcome: Progressing      Problem: Potential for Falls  Goal: Patient will remain free of falls  INTERVENTIONS:  - Assess patient frequently for physical needs  -  Identify cognitive and physical deficits and behaviors that affect risk of falls    -  [place] fall precautions as indicated by assessment   - Educate patient/family on patient safety including physical limitations  - Instruct patient to call for assistance with activity based on assessment  - Modify environment to reduce risk of injury  - Consider OT/PT consult to assist with strengthening/mobility    Outcome: Progressing      Problem: Prexisting or High Potential for Compromised Skin Integrity  Goal: Skin integrity is maintained or improved  INTERVENTIONS:  - Identify patients at risk for skin breakdown  - Assess and monitor skin integrity  - Assess and monitor nutrition and hydration status  - Monitor labs (i e  albumin)  - Assess for incontinence   - Turn and reposition patient  - Assist with mobility/ambulation  - Relieve pressure over bony prominences  - Avoid friction and shearing  - Provide appropriate hygiene as needed including keeping skin clean and dry  - Evaluate need for skin moisturizer/barrier cream  - Collaborate with interdisciplinary team (i e  Nutrition, Rehabilitation, etc )   - Patient/family teaching   Outcome: Progressing

## 2018-12-26 NOTE — SOCIAL WORK
[facility] at [place] requesting PASRR for [address] of major depression  VERONICA and SLIM attempted to call patient's daughter x3 to discuss PASRR - call either disconnects or goes to , message left on VM  Per Sanjay Umana at [facility] they would not be able to take on 30 day exception  CM placed call to [facility] and spoke with Vivi Ortega to discuss whether or not pt would be positive PASRR for Major depression alone  Discussed that pt was set up to d/c to [facility] at 4:30 PM  Vivi Ortega reports she will call CM back with clarification  CM received call back from [facility] to [facility]  Per Arelis Mireles, she is unsure if pt will be a target based on Major Depression diagnosis alone but the supervisors involved in [address] [place] [address] will be back in office tomorrow  Sarah advised CM initiate optioning process and follow up with aging office tomorrow  Discussed with Dr Arjun Kim and request made to order psych eval for optioning  Call placed to [facility] to cancel transport with [facility]  Awaiting call back from patient's daughter to completed PASRR and discuss optioning process  Discussed with RN Suad Valverde and request made to have daughter contact CM if she gets in touch with nurse  CM discussed above with Sanjay Umana at [facility]  CM department will continue to follow

## 2018-12-26 NOTE — SOCIAL WORK
Pt's daughter arrived at bedside  Discussed optioning and reviewed PASRR  Pt's daughter denies recent SI/HI  She reports pt did have in[address] stay in June 2016 and was discharged to [facility] at that time but none since then  [facility] paperwork signed by patients daughter - will submit to [employer] in AM following psych eval  Discussed with pt daughter that pt may be ready to d/c Friday at the soonest versus after the weekend  Discussed with RN and JUJU

## 2018-12-26 NOTE — PROGRESS NOTES
Progress Note - Erlinda Gustafson 1940, [de-identified] y o  female MRN: [id number]    Unit/Bed#: -01 Encounter: [id number]    Primary Care Provider: ODILIA Frazier   Date and time admitted to hospital: 12/19/2018  9:20 AM        She continues to refuse lab draws    Generalized weakness   Assessment & Plan    · PT recommended short-term rehab     COPD (chronic obstructive pulmonary disease) ([facility] [address] )   Assessment & Plan    · No exacerbation at this point  · Continue patient's nebulizations/inhalers  · According to the sign out, patient is on chronic oxygen at 2 L  Thus will continue with this  · PT recommends discharge to short-term rehab  · stable     Dementia   Assessment & Plan    · Cont risperdal, aricept at home dosing  · Supportive care  · Daughter agreeable to STR     * HCAP (healthcare-associated pneumonia)   Assessment & Plan    · Came from a personal living facility  · Present on admission, with cough noted in the emergency room and reports of shortness of breath in the facility  Chest x-ray revealed pneumonia  · In the emergency room, patient was given IV cefepime, IV vancomycin and IV azithromycin  · Based on micro results, cefepime was DC , was started on Rocephin IV 12/23/2018  · Total IV antibiotics course since 12/20/2018  · Clinically significantly improved  · Blood cultures positive 1/2 bottles with coagulase-negative Staphylococcus  Likely contaminant, repeat cultures were sent yesterday awaiting result before then discharge  · PT recommends discharge to short-term rehab-awaiting placement         VTE Pharmacologic Prophylaxis:   Pharmacologic: Enoxaparin (Lovenox)  Mechanical VTE Prophylaxis in Place: Yes    Patient Centered Rounds: I have performed bedside rounds with nursing staff today  Discussions with Specialists or Other Care Team Provider:     Education and Discussions with Family / Patient:     Time Spent for Care: 30 minutes    More than 50% of total time spent on counseling and coordination of care as described above  Current Length of Stay: 7 day(s)    Current Patient Status: Inpatient   Certification Statement: The patient will continue to require additional inpatient hospital stay due to awaiting placement STR    Discharge Plan: STR    Code Status: Level 3 - DNAR and DNI      Subjective:   No c/o  Objective:     Vitals:   Temp (24hrs), Av 7 °F (37 1 °C), Min:98 6 °F (37 °C), Max:99 °F (37 2 °C)    Temp:  [98 6 °F (37 °C)-99 °F (37 2 °C)] 99 °F (37 2 °C)  HR:  [75-89] 75  Resp:  [18-20] 18  BP: (135-163)/(69-83) 163/69  SpO2:  [92 %-98 %] 98 %  Body mass index is 34 87 kg/m²  Input and Output Summary (last 24 hours): Intake/Output Summary (Last 24 hours) at 18 1145  Last data filed at 18 1046   Gross per 24 hour   Intake                0 ml   Output              700 ml   Net             -700 ml       Physical Exam:     Physical Exam   Constitutional: She is oriented to person, place, and time  She appears well-developed and well-nourished  HENT:   Head: Normocephalic and atraumatic  Eyes: Pupils are equal, round, and reactive to light  Cardiovascular: Normal rate and regular rhythm  Pulmonary/Chest: Effort normal and breath sounds normal    Abdominal: Soft  Musculoskeletal: She exhibits edema  Neurological: She is alert and oriented to person, place, and time           Additional Data:     Labs:      Results from last 7 days  Lab Units 18  0503   WBC Thousand/uL 3 20*   HEMOGLOBIN g/dL 10 3*   HEMATOCRIT % 32 3*   PLATELETS Thousands/uL 177   NEUTROS PCT % 68   LYMPHS PCT % 18   MONOS PCT % 11   EOS PCT % 3       Results from last 7 days  Lab Units 18  0503   SODIUM mmol/L 141   POTASSIUM mmol/L 4 0   CHLORIDE mmol/L 105   CO2 mmol/L 28   BUN mg/dL 7   CREATININE mg/dL 0 76   ANION GAP mmol/L 8   CALCIUM mg/dL 8 2*   GLUCOSE RANDOM mg/dL 93                   Results from last 7 days  Lab Units 18  0649 12/19/18  1419   LACTIC ACID mmol/L  --  1 0   PROCALCITONIN ng/ml 1 79* 1 83*           * I Have Reviewed All Lab Data Listed Above  * Additional Pertinent Lab Tests Reviewed: All Labs Within Last 24 Hours Reviewed    Imaging:    Imaging Reports Reviewed Today Include:   Imaging Personally Reviewed by Myself Includes:      Recent Cultures (last 7 days):       Results from last 7 days  Lab Units 12/23/18  1230 12/19/18  1647 12/19/18  1420 12/19/18  1419 12/19/18  1416   BLOOD CULTURE  No Growth at 48 hrs  --  No Growth After 5 Days   Staphylococcus coagulase negative*  --    GRAM STAIN RESULT   --   --   --  Gram positive cocci in clusters  --    INFLUENZA B PCR   --  None Detected  --   --   --    RSV PCR   --  None Detected  --   --   --    LEGIONELLA URINARY ANTIGEN   --   --   --   --  Negative       Last 24 Hours Medication List:     Current Facility-Administered Medications:  acetaminophen 488 mg Oral Q6H PRN Josué Hyde MD    albuterol 2 puff Inhalation Q4H PRN Josué Hyde MD    budesonide-formoterol 2 puff Inhalation BID Josué Hyde MD    cefTRIAXone 1,000 mg Intravenous Q24H Franki Lomax MD Last Rate: Stopped (12/25/18 1209)   cholecalciferol 1,000 Units Oral Daily Josué Hyde MD    citalopram 10 mg Oral Daily Josué Hyde MD    cyanocobalamin 100 mcg Oral Daily Josué Hyde MD    donepezil 10 mg Oral HS Josué Hyde MD    enoxaparin 40 mg Subcutaneous Daily Josué Hyde MD    guaiFENesin 600 mg Oral Q12H [address] Josué Hyde MD    ondansetron 4 mg Intravenous Q6H PRN Josué Hyde MD    potassium chloride 10 mEq Oral Daily Josué Hyde MD    risperiDONE 0 5 mg Oral HS Josué Hyde MD    senna 1 tablet Oral HS PRN Josué Hyde MD    tiotropium 18 mcg Inhalation Daily Dany Hyde MD         Today, Patient Was Seen By: Tahira Hoffman MD    ** Please Note: Dictation voice to text software may have been used in the creation of this document   **

## 2018-12-26 NOTE — ASSESSMENT & PLAN NOTE
· Came from a personal living facility  · Present on admission, with cough noted in the emergency room and reports of shortness of breath in the facility  Chest x-ray revealed pneumonia  · In the emergency room, patient was given IV cefepime, IV vancomycin and IV azithromycin  · Based on micro results, cefepime was DC , was started on Rocephin IV 12/23/2018  · Total IV antibiotics course since 12/20/2018  · Clinically significantly improved  · Blood cultures positive 1/2 bottles with coagulase-negative Staphylococcus  Likely contaminant, repeat cultures were sent yesterday awaiting result before then discharge    · PT recommends discharge to short-term rehab-[facility] jocelyn magdaleno [place]

## 2018-12-26 NOTE — ASSESSMENT & PLAN NOTE
· No exacerbation at this point  · Continue patient's nebulizations/inhalers  · According to the sign out, patient is on chronic oxygen at 2 L  Thus will continue with this  · PT recommends discharge to short-term rehab    · stable

## 2018-12-26 NOTE — ASSESSMENT & PLAN NOTE
· Came from a personal living facility  · Present on admission, with cough noted in the emergency room and reports of shortness of breath in the facility  Chest x-ray revealed pneumonia  · In the emergency room, patient was given IV cefepime, IV vancomycin and IV azithromycin  · Based on micro results, cefepime was DC , was started on Rocephin IV 12/23/2018  · Total IV antibiotics course since 12/20/2018  · Clinically significantly improved  · Blood cultures positive 1/2 bottles with coagulase-negative Staphylococcus  Likely contaminant, repeat cultures were sent yesterday awaiting result before then discharge    · PT recommends discharge to short-term rehab-awaiting placement

## 2018-12-26 NOTE — DISCHARGE SUMMARY
Discharge- Raphael Beat 1940, [de-identified] y o  female MRN: [id number]    Unit/Bed#: -01 Encounter: [id number]    Primary Care Provider: ODILIA Oliveira   Date and time admitted to hospital: 12/19/2018  9:20 AM        Generalized weakness   Assessment & Plan    · PT recommended short-term rehab-accepted at [facility]     COPD (chronic obstructive pulmonary disease) ([address] )   Assessment & Plan    · No exacerbation at this point  · Continue patient's nebulizations/inhalers  · on chronic oxygen at 2 L  Thus will continue with this  · PT recommends discharge to short-term rehab  · stable     Dementia   Assessment & Plan    · Cont risperdal, aricept at home dosing  · Supportive care  · Daughter agreeable to STR     * HCAP (healthcare-associated pneumonia)   Assessment & Plan    · Came from a personal living facility  · Present on admission, with cough noted in the emergency room and reports of shortness of breath in the facility  Chest x-ray revealed pneumonia  · In the emergency room, patient was given IV cefepime, IV vancomycin and IV azithromycin  · Based on micro results, cefepime was DC , was started on Rocephin IV 12/23/2018  · Total IV antibiotics course since 12/20/2018  · Clinically significantly improved  · Blood cultures positive 1/2 bottles with coagulase-negative Staphylococcus  Likely contaminant, repeat cultures were sent yesterday awaiting result before then discharge    · PT recommends discharge to short-term rehab-accepted [facility]           Discharging Physician / Practitioner: Ian Aly MD  PCP: Perla Oliveira  Admission Date:   Admission Orders     Ordered        12/19/18 1335  Inpatient Admission (expected length of stay for this patient is greater than two midnights)  Once             Discharge Date: 12/26/18    Resolved Problems  Date Reviewed: 12/25/2018    None          Consultations During Hospital Stay:  ·     Procedures Performed: Significant Findings / Test Results:     ·     Incidental Findings:   ·      Test Results Pending at Discharge (will require follow up):   ·      Outpatient Tests Requested:  · cxr 6 weeks follow up pneumonia    Complications:      Reason for Admission:     Hospital Course:     Devan Ace is a [de-identified] y o  female patient who originally presented to the hospital on 12/19/2018 due to pneumonia, HCAP    Please see above list of diagnoses and related plan for additional information  Condition at Discharge: stable     Discharge Day Visit / Exam:     * Please refer to separate progress note for these details *    Discussion with Family: daughter    Discharge instructions/Information to patient and family:   See after visit summary for information provided to patient and family  Provisions for Follow-Up Care:  See after visit summary for information related to follow-up care and any pertinent home health orders  Disposition:     Acute Rehab at [facility]    For Discharges to [facility]:   · Not Applicable to this Patient - Not Applicable to this Patient    Planned Readmission: 40     Discharge Statement:  I spent 40 minutes discharging the patient  This time was spent on the day of discharge  I had direct contact with the patient on the day of discharge  Greater than 50% of the total time was spent examining patient, answering all patient questions, arranging and discussing plan of care with patient as well as directly providing post-discharge instructions  Additional time then spent on discharge activities  Discharge Medications:  See after visit summary for reconciled discharge medications provided to patient and family        ** Please Note: This note has been constructed using a voice recognition system **

## 2018-12-26 NOTE — PLAN OF CARE
Problem: DISCHARGE PLANNING - CARE MANAGEMENT  Goal: Discharge to post-acute care or home with appropriate resources  INTERVENTIONS:  - Conduct assessment to determine patient/family and health care team treatment goals, and need for post-acute services based on payer coverage, community resources, and patient preferences, and barriers to discharge  - Address psychosocial, clinical, and financial barriers to discharge as identified in assessment in conjunction with the patient/family and health care team  - Arrange appropriate level of post-acute services according to patients   needs and preference and payer coverage in collaboration with the physician and health care team  - Communicate with and update the patient/family, physician, and health care team regarding progress on the discharge plan  - Arrange appropriate transportation to post-acute venues   Outcome: Progressing  CM placed call [address] [facility] contacts provided by pt's daughter - Carlos (G#[phone]) VMM left  Attempted to call Luis Aaron (U#[phone]) unable to leave [facility] message

## 2018-12-26 NOTE — ASSESSMENT & PLAN NOTE
· No exacerbation at this point  · Continue patient's nebulizations/inhalers  · on chronic oxygen at 2 L  Thus will continue with this  · PT recommends discharge to short-term rehab    · stable

## 2018-12-27 PROCEDURE — 99232 SBSQ HOSP IP/OBS MODERATE 35: CPT | Performed by: GENERAL PRACTICE

## 2018-12-27 PROCEDURE — 97530 THERAPEUTIC ACTIVITIES: CPT

## 2018-12-27 PROCEDURE — 97110 THERAPEUTIC EXERCISES: CPT

## 2018-12-27 RX ADMIN — ENOXAPARIN SODIUM 40 MG: 40 INJECTION SUBCUTANEOUS at 10:20

## 2018-12-27 RX ADMIN — VITAM B12 100 MCG: 100 TAB at 10:20

## 2018-12-27 RX ADMIN — CEFDINIR 300 MG: 300 CAPSULE ORAL at 10:20

## 2018-12-27 RX ADMIN — TIOTROPIUM BROMIDE 18 MCG: 18 CAPSULE ORAL; RESPIRATORY (INHALATION) at 10:21

## 2018-12-27 RX ADMIN — BUDESONIDE AND FORMOTEROL FUMARATE DIHYDRATE 2 PUFF: 160; 4.5 AEROSOL RESPIRATORY (INHALATION) at 17:46

## 2018-12-27 RX ADMIN — CITALOPRAM HYDROBROMIDE 10 MG: 20 TABLET ORAL at 10:20

## 2018-12-27 RX ADMIN — RISPERIDONE 0.5 MG: 0.25 TABLET ORAL at 21:41

## 2018-12-27 RX ADMIN — DONEPEZIL HYDROCHLORIDE 10 MG: 5 TABLET ORAL at 21:41

## 2018-12-27 RX ADMIN — ACETAMINOPHEN 488 MG: 325 TABLET, FILM COATED ORAL at 15:02

## 2018-12-27 RX ADMIN — POTASSIUM CHLORIDE 10 MEQ: 750 TABLET, EXTENDED RELEASE ORAL at 10:20

## 2018-12-27 RX ADMIN — GUAIFENESIN 600 MG: 600 TABLET, EXTENDED RELEASE ORAL at 21:41

## 2018-12-27 RX ADMIN — BUDESONIDE AND FORMOTEROL FUMARATE DIHYDRATE 2 PUFF: 160; 4.5 AEROSOL RESPIRATORY (INHALATION) at 10:21

## 2018-12-27 RX ADMIN — GUAIFENESIN 600 MG: 600 TABLET, EXTENDED RELEASE ORAL at 10:20

## 2018-12-27 RX ADMIN — CEFDINIR 300 MG: 300 CAPSULE ORAL at 21:41

## 2018-12-27 RX ADMIN — VITAMIN D, TAB 1000IU (100/BT) 1000 UNITS: 25 TAB at 10:21

## 2018-12-27 NOTE — OCCUPATIONAL THERAPY NOTE
OCCUPATIONAL THERAPY TREATMENT  NOTE         12/27/18 1430   Restrictions/Precautions   Weight Bearing Precautions Per Order No   Other Precautions Cognitive; Chair Alarm; Bed Alarm; Fall Risk   Pain Assessment   Pain Assessment No/denies pain   Pain Score No Pain   Bed Mobility   Rolling R Unable to assess   Additional items Comment  (c/o pain)   Rolling L 3  Moderate assistance   Additional items Assist x 1;Bedrails; Increased time required;Verbal cues;LE management   Supine to Sit 1  Dependent  (attempted with A +4 SB,however pt refused due to c/opain LLE)   Additional Comments Attempted supine > sit  with 4    Transfers   Sit to Stand Unable to assess   Therapeutic Excerise-Strength   UE Strength Yes   Right Upper Extremity- Strength   R Shoulder Flexion;ABduction; Extension;Horizontal ABduction; External rotation; Internal rotation   R Elbow Elbow flexion;Elbow extension   R Weight/Reps/Sets 10 reps x 2 ea ex   Left Upper Extremity-Strength   L Shoulder Flexion;ABduction; Extension;Horizontal ABduction; External rotation; Internal rotation   L Elbow Elbow flexion;Elbow extension   L Weights/Reps/Sets 10 reps x 2 ea ex   Cognition   Overall Cognitive Status Impaired   Arousal/Participation Alert; Cooperative   Attention Attends with cues to redirect   Orientation Level Oriented to place   Memory Decreased recall of biographical information;Decreased short term memory;Decreased recall of recent events;Decreased recall of precautions   Following Commands Follows one step commands with increased time or repetition   Comments Pt agreeable to skilled OT   Activity Tolerance   Activity Tolerance Patient tolerated treatment well   Assessment   Assessment Pt agreeable to skilled OT services with focus on improving functional mobility  Pt supine in bed upon start of session eating lunch  Pt not agreeable to OOB mobility initially, however would agree to attempt later in session   Pt seated in bed able to participate in UE there ex's to assist with strengthening UB for increased independence and safety  Pt able to perform elb flex/ext , shdr inter/ exter and flex/ext   Some deficits in ROM , however pt worked to best of ability  Pt able to roll onto L side for hygeine, requiring Mod A and placement of hands onto bed rails, however pt unable to complete R side roll due to c/o pain  Pt agreeable to attempt supine > sit , however may have become overwhelmed by presence of 4 people in room to assist and abruptly refused  RN reported that 2 days ago, pt was able to txfer supine > sit > SPVT > recliner with max A +2  Pt returned to comfortable supine position   Pt performance demonstrated inconsistent  carryover of learned techniques and strategies to facilitate safety during self care and daily routine  Pt continues to demonstrate deficits in the areas of self care, UE strength and functional mobility  Pt would continue to benefit from skilled OT POC to facilitate return to PLOF and reduce BOC for caregivers  Plan   Treatment Interventions ADL retraining;Visual perceptual retraining;Functional transfer training;UE strengthening/ROM; Endurance training;Cognitive reorientation;Patient/family training;Equipment evaluation/education; Fine motor coordination activities; Compensatory technique education;Continued evaluation; Energy conservation; Activityengagement   Goal Expiration Date 01/03/19   OT Frequency 3-5x/wk                                                     NIYAH Almonte/GLENNA

## 2018-12-27 NOTE — PROGRESS NOTES
Progress Note - Domitila Gao 1940, [de-identified] y o  female MRN: [id number]    Unit/Bed#: -01 Encounter: [id number]    Primary Care Provider: ODILIA Melendez   Date and time admitted to hospital: 12/19/2018  9:20 AM        COPD (chronic obstructive pulmonary disease) ([address] )   Assessment & Plan    · No exacerbation at this point  · Continue patient's nebulizations/inhalers  · on chronic oxygen at 2 L  Thus will continue with this  · PT recommends discharge to short-term rehab  · stable     Dementia   Assessment & Plan    · Cont risperdal, aricept at home dosing  · Supportive care  · Daughter agreeable to STR     Encephalopathy-resolved as of 12/26/2018   Assessment & Plan    · According to the ER sign out and report, patient had some alteration mental status in the facility  · Likely disease resolved as patient is presently answering questions appropriately and relatively oriented  Patient likely back at her baseline  Patient has baseline dementia  · Likely disease due to patient's infection/pneumonia  · Observe  · CT scan of the head was negative  · PT recommended short-term rehab     * HCAP (healthcare-associated pneumonia)-resolved as of 12/26/2018   Assessment & Plan    · Came from a personal living facility  · Present on admission, with cough noted in the emergency room and reports of shortness of breath in the facility  Chest x-ray revealed pneumonia  · In the emergency room, patient was given IV cefepime, IV vancomycin and IV azithromycin  · Based on micro results, cefepime was DC , was started on Rocephin IV 12/23/2018  · Total IV antibiotics course since 12/20/2018  · Clinically significantly improved  · Blood cultures positive 1/2 bottles with coagulase-negative Staphylococcus  Likely contaminant, repeat cultures were sent yesterday awaiting result before then discharge    · PT recommends discharge to short-term rehab-accepted [facility] at [facility] but with h/o MDD needs optioning by [place]  · Psychiatry consult placed         VTE Pharmacologic Prophylaxis:   Pharmacologic: Enoxaparin (Lovenox)  Mechanical VTE Prophylaxis in Place: Yes    Patient Centered Rounds: I have performed bedside rounds with nursing staff today  Discussions with Specialists or Other Care Team Provider:     Education and Discussions with Family / Patient:     Time Spent for Care: 30 minutes  More than 50% of total time spent on counseling and coordination of care as described above  Current Length of Stay: 8 day(s)    Current Patient Status: Inpatient   Certification Statement: The patient will continue to require additional inpatient hospital stay due to awaiting optioning process by state with h/o MDD    Discharge Plan: STR    Code Status: Level 3 - DNAR and DNI      Subjective:   No c/o-breathing stable    Objective:     Vitals:   Temp (24hrs), Av 4 °F (36 9 °C), Min:98 3 °F (36 8 °C), Max:98 6 °F (37 °C)    Temp:  [98 3 °F (36 8 °C)-98 6 °F (37 °C)] 98 3 °F (36 8 °C)  HR:  [78-85] 81  Resp:  [18] 18  BP: (132-153)/(64-74) 141/74  SpO2:  [94 %-97 %] 97 %  Body mass index is 34 87 kg/m²  Input and Output Summary (last 24 hours): Intake/Output Summary (Last 24 hours) at 18 0944  Last data filed at 18 0900   Gross per 24 hour   Intake              340 ml   Output              650 ml   Net             -310 ml       Physical Exam:     Physical Exam   Constitutional: She appears well-developed and well-nourished  HENT:   Head: Normocephalic and atraumatic  Eyes: Pupils are equal, round, and reactive to light  Pulmonary/Chest: Effort normal  No respiratory distress  She has no wheezes  Abdominal: Soft  Bowel sounds are normal    Musculoskeletal: She exhibits no edema           Additional Data:     Labs:      Results from last 7 days  Lab Units 18  0503   WBC Thousand/uL 3 20*   HEMOGLOBIN g/dL 10 3*   HEMATOCRIT % 32 3*   PLATELETS Thousands/uL 177   NEUTROS PCT % 68   LYMPHS PCT % 18 MONOS PCT % 11   EOS PCT % 3       Results from last 7 days  Lab Units 12/22/18  0503   SODIUM mmol/L 141   POTASSIUM mmol/L 4 0   CHLORIDE mmol/L 105   CO2 mmol/L 28   BUN mg/dL 7   CREATININE mg/dL 0 76   ANION GAP mmol/L 8   CALCIUM mg/dL 8 2*   GLUCOSE RANDOM mg/dL 93                   Results from last 7 days  Lab Units 12/21/18  0649   PROCALCITONIN ng/ml 1 79*           * I Have Reviewed All Lab Data Listed Above  * Additional Pertinent Lab Tests Reviewed: All Labs Within Last 24 Hours Reviewed    Imaging:    Imaging Reports Reviewed Today Include:   Imaging Personally Reviewed by Myself Includes:      Recent Cultures (last 7 days):       Results from last 7 days  Lab Units 12/23/18  1230   BLOOD CULTURE  No Growth at 72 hrs  Last 24 Hours Medication List:     Current Facility-Administered Medications:  acetaminophen 488 mg Oral Q6H PRN Josué Hyde MD   albuterol 2 puff Inhalation Q4H PRN Josué Hyde MD   budesonide-formoterol 2 puff Inhalation BID Josué Hyde MD   cefdinir 300 mg Oral Q12H [address] Annie Roy MD   cholecalciferol 1,000 Units Oral Daily Josué Hyde MD   citalopram 10 mg Oral Daily Josué Hyde MD   cyanocobalamin 100 mcg Oral Daily Josué Hyde MD   donepezil 10 mg Oral HS Josué Hyde MD   enoxaparin 40 mg Subcutaneous Daily Josué Hyde MD   guaiFENesin 600 mg Oral Q12H [address] Josué Hyde MD   ondansetron 4 mg Intravenous Q6H PRN Josué Hyde MD   potassium chloride 10 mEq Oral Daily Josué Hyde MD   risperiDONE 0 5 mg Oral HS Josué Hyde MD   senna 1 tablet Oral HS PRN Josué Hyde MD   tiotropium 18 mcg Inhalation Daily Jessica Chew MD        Today, Patient Was Seen By: Annie Roy MD    ** Please Note: Dictation voice to text software may have been used in the creation of this document   **

## 2018-12-27 NOTE — PLAN OF CARE
Problem: OCCUPATIONAL THERAPY ADULT  Goal: Performs self-care activities at highest level of function for planned discharge setting  See evaluation for individualized goals  Treatment Interventions: ADL retraining, Functional transfer training, UE strengthening/ROM, Endurance training, Patient/family training, Equipment evaluation/education, Fine motor coordination activities, Compensatory technique education, Continued evaluation, Cardiac education, Energy conservation, Activityengagement          See flowsheet documentation for full assessment, interventions and recommendations  Outcome: Progressing  Limitation: Decreased ADL status, Decreased UE ROM, Decreased UE strength, Decreased Safe judgement during ADL, Decreased cognition, Decreased endurance, Decreased fine motor control, Decreased self-care trans, Decreased high-level ADLs  Prognosis: Good  Assessment: Pt agreeable to skilled OT services with focus on improving functional mobility  Pt supine in bed upon start of session eating lunch  Pt not agreeable to OOB mobility initially, however would agree to attempt later in session  Pt seated in bed able to participate in UE there ex's to assist with strengthening UB for increased independence and safety  Pt able to perform elb flex/ext , shdr inter/ exter and flex/ext   Some deficits in ROM , however pt worked to best of ability  Pt able to roll onto L side for hygeine, requiring Mod A and placement of hands onto bed rails, however pt unable to complete R side roll due to c/o pain  Pt agreeable to attempt supine > sit , however may have become overwhelmed by presence of 4 people in room to assist and abruptly refused  RN reported that 2 days ago, pt was able to txfer supine > sit > SPVT > recliner with max A +2  Pt returned to comfortable supine position   Pt performance demonstrated inconsistent  carryover of learned techniques and strategies to facilitate safety during self care and daily routine   Pt continues to demonstrate deficits in the areas of self care, UE strength and functional mobility  Pt would continue to benefit from skilled OT POC to facilitate return to PLOF and reduce BOC for caregivers

## 2018-12-27 NOTE — PLAN OF CARE
Problem: PAIN - ADULT  Goal: Verbalizes/displays adequate comfort level or baseline comfort level  Interventions:  - Encourage patient to monitor pain and request assistance  - Assess pain using appropriate pain scale  - Administer analgesics based on type and severity of pain and evaluate response  - Implement non-pharmacological measures as appropriate and evaluate response  - Consider cultural and social influences on pain and pain management  - Notify physician/advanced practitioner if interventions unsuccessful or patient reports new pain   Outcome: Progressing      Problem: INFECTION - ADULT  Goal: Absence or prevention of progression during hospitalization  INTERVENTIONS:  - Assess and monitor for signs and symptoms of infection  - Monitor lab/diagnostic results  - Monitor all insertion sites, i e  indwelling lines, tubes, and drains  - Monitor endotracheal (as able) and nasal secretions for changes in amount and color  - [place] appropriate cooling/warming therapies per order  - Administer medications as ordered  - Instruct and encourage patient and family to use good hand hygiene technique  - Identify and instruct in appropriate isolation precautions for identified infection/condition   Outcome: Progressing      Problem: SAFETY ADULT  Goal: Patient will remain free of falls  INTERVENTIONS:  - Assess patient frequently for physical needs  -  Identify cognitive and physical deficits and behaviors that affect risk of falls    -  [place] fall precautions as indicated by assessment   - Educate patient/family on patient safety including physical limitations  - Instruct patient to call for assistance with activity based on assessment  - Modify environment to reduce risk of injury  - Consider OT/PT consult to assist with strengthening/mobility    Outcome: Progressing      Problem: DISCHARGE PLANNING  Goal: Discharge to home or other facility with appropriate resources  INTERVENTIONS:  - Identify barriers to discharge w/patient and caregiver  - Arrange for needed discharge resources and transportation as appropriate  - Identify discharge learning needs (meds, wound care, etc )  - Arrange for interpretive services to assist at discharge as needed  - Refer to Case Management Department for coordinating discharge planning if the patient needs post-hospital services based on physician/advanced practitioner order or complex needs related to functional status, cognitive ability, or social support system   Outcome: Progressing      Problem: Knowledge Deficit  Goal: Patient/family/caregiver demonstrates understanding of disease process, treatment plan, medications, and discharge instructions  Complete learning assessment and assess knowledge base    Interventions:  - Provide teaching at level of understanding  - Provide teaching via preferred learning methods   Outcome: Progressing      Problem: MUSCULOSKELETAL - ADULT  Goal: Maintain or return mobility to safest level of function  INTERVENTIONS:  - Assess patient's ability to carry out ADLs; assess patient's baseline for ADL function and identify physical deficits which impact ability to perform ADLs (bathing, care of mouth/teeth, toileting, grooming, dressing, etc )  - Assess/evaluate cause of self-care deficits   - Assess range of motion  - Assess patient's mobility; develop plan if impaired  - Assess patient's need for assistive devices and provide as appropriate  - Encourage maximum independence but intervene and supervise when necessary  - Involve family in performance of ADLs  - Assess for home care needs following discharge   - Request OT consult to assist with ADL evaluation and planning for discharge  - Provide patient education as appropriate   Outcome: Progressing      Problem: Potential for Falls  Goal: Patient will remain free of falls  INTERVENTIONS:  - Assess patient frequently for physical needs  -  Identify cognitive and physical deficits and behaviors that affect risk of falls    -  [place] fall precautions as indicated by assessment   - Educate patient/family on patient safety including physical limitations  - Instruct patient to call for assistance with activity based on assessment  - Modify environment to reduce risk of injury  - Consider OT/PT consult to assist with strengthening/mobility    Outcome: Progressing      Problem: Prexisting or High Potential for Compromised Skin Integrity  Goal: Skin integrity is maintained or improved  INTERVENTIONS:  - Identify patients at risk for skin breakdown  - Assess and monitor skin integrity  - Assess and monitor nutrition and hydration status  - Monitor labs (i e  albumin)  - Assess for incontinence   - Turn and reposition patient  - Assist with mobility/ambulation  - Relieve pressure over bony prominences  - Avoid friction and shearing  - Provide appropriate hygiene as needed including keeping skin clean and dry  - Evaluate need for skin moisturizer/barrier cream  - Collaborate with interdisciplinary team (i e  Nutrition, Rehabilitation, etc )   - Patient/family teaching   Outcome: Progressing

## 2018-12-27 NOTE — CONSULTS
Patient name & :   Cheryl Hopson 36  Date & time of consultation:  18, 11am   Location of patient:  [facility]  Location of doctor: [place], [place]  ___________________________________________________________  Chief Complaint: Telepsychiatry consultation  This evaluation was conducted via Telepsychiatry with the assistance of onsite staff  History of Present Illness: This pt is an [de-identified] yr old female    Chart reviewed and case discussed with treatment team  Per chart she has hx of dementia and depression  She came to the hospital on  from her living facility  She was presenting with sx of AMS and SOB  She was admitted for pneumonia and AMS  Per the [facility] team - the plan is for the pt to be discharged to a short term rehab facility for management of weakness and that rehab facility requires that a psychiatric evaluation be completed  Per treatment team, here in the hospital, the pt has not been a management issue  She has not been aggressive/agitated, she has not endorsed any SI or HI, she has been compliant with her medications  There have been no other issues  Telepsychiatry was consulted for assessment and recommendations for management  Upon observation of the pt she was noted to be lying in bed  During the interview she is very cooperative, calm, pleasant, smiling throughout the interview  SHe is able to tell me her name, , age  She does not know the year  She does know the current president  She knows that she is at [facility]  She does not know how long she has been here for or why she came to the hospital - she says 'I was feeling sick " She says that she is feeling better now  She has no complaints  She says that she is eating and sleeping well  She is not in pain  When asked about her psych history she says that she has never seen a psychiatrist and has no hx of psychiatric issues ie depression/anxiety   She says that prior to coming to the hospital she was living with her daughter - per chart she was living at a facility  Interview with pt was limited so historical information below was obtained from the chart  Collateral:  see HPI    Psychiatric History/Treatment History:    -Inpatient: unknown  -Outpatient:  unknown  -Previous psychotropic medications: risperdal and celexa per chart  -History of suicide attempts: unknown     Drug/Alcohol History: unknown; no UDS on file    Medical History:  -Medical problems: per chart - dementia, COPD  -Current medications:  see chart  Current psych meds include celexa 10mg daily, risperdal 0 5mg PO qhs, donepezil  Per chart these were also her home meds  -Medication Allergies:  penicillin, sulfa     Family Psychiatric History: unknown     Social History:      -Employment: no   -Housing: lives at a living facility     -Stressors: medical   -Strength/supports: unknown      Mental Status Exam:  Appearance and attire:  hopsital attire, lying in bed, appears stated age, appropriate grooming, appears well nourished  Attitude and behavior: cooperative, calm, interactive  Speech: clear, coherent, simple and short responses  Affect and mood: smiling during interview  Association and thought processes: goal directed but impoverished  Thought content: no overt delusions  SI and Hi not endorsed  Perception:  pt does not appear to be responding to internal stimuli  Sensorium, memory, and orientation:  AAOx person, place not time  Intellectual functioning: unable to assess  Insight and judgment: impaired     Diagnosis: Unspecified neurocognitive disorder  Unspecified depressive disorder by hx      Impression/Risk Assessment/Treatment Recommendations:  -Recommended level of care: This pt is an [de-identified] yr old female with hx of dementia and depression, per chart  She came to the hospital and is being treated for pneumonia and AMS   Per the [facility] team - the plan is for the pt to be discharged to a short term rehab facility for management of weakness  Per treatment team, here in the hospital, the pt has not been a management issue  She has not been aggressive/agitated, she has not endorsed any SI or HI, she has been compliant with her medications  There have been no other issues  Upon interview the pt is AAOx2 with impoverished thought process but she is very pleasant  She denies having any psychiatric complaints  The patient does not meet criteria for inpatient psychiatric hospitalization, as she is not suicidal, homicidal, grossly psychotic, manic or aggressive/agitated  Thus the patient is psychiatrically stable for discharge  -Recommended pharmacology/therapy/other treatments:     Her current psychiatric medications were reviewed; no other recommendations indicated at this time  Thank you for this consult, please re-consult for follow up if indicated            Terry Jules MD  Telepsychiatry

## 2018-12-27 NOTE — ASSESSMENT & PLAN NOTE
· Came from a personal living facility  · Present on admission, with cough noted in the emergency room and reports of shortness of breath in the facility  Chest x-ray revealed pneumonia  · In the emergency room, patient was given IV cefepime, IV vancomycin and IV azithromycin  · Based on micro results, cefepime was DC , was started on Rocephin IV 12/23/2018  · Total IV antibiotics course since 12/20/2018  · Clinically significantly improved  · Blood cultures positive 1/2 bottles with coagulase-negative Staphylococcus  Likely contaminant, repeat cultures were sent yesterday awaiting result before then discharge    · PT recommends discharge to short-term rehab-accepted [facility] [place] but with h/o MDD needs optioning by [place]  · Psychiatry consult placed

## 2018-12-27 NOTE — SOCIAL WORK
CM spoke with Moises Champion at the [facility] report due to hx of dementia and depression they will have to do option assessment to determine if pt is a target  Moises Champion reports nurse  Davi Silva should be out to see pt this afternoon  CM department will continue to follow

## 2018-12-27 NOTE — PLAN OF CARE
Problem: DISCHARGE PLANNING - CARE MANAGEMENT  Goal: Discharge to post-acute care or home with appropriate resources  INTERVENTIONS:  - Conduct assessment to determine patient/family and health care team treatment goals, and need for post-acute services based on payer coverage, community resources, and patient preferences, and barriers to discharge  - Address psychosocial, clinical, and financial barriers to discharge as identified in assessment in conjunction with the patient/family and health care team  - Arrange appropriate level of post-acute services according to patients   needs and preference and payer coverage in collaboration with the physician and health care team  - Communicate with and update the patient/family, physician, and health care team regarding progress on the discharge plan  - Arrange appropriate transportation to post-acute venues   Outcome: Progressing  Option packet completed and faxed to [facility]  CM left M with [employer] at [facility] to discuss if pt requires optioning  Abhay Orellana at [facility] [place] updated  Discussed with RN and SLIM  CM will continue to follow

## 2018-12-27 NOTE — SOCIAL WORK
Option packet completed and faxed to [employer]  CM left VMM with Rossy Carter at [facility] to discuss if pt requires optioning  Wendy Willett at [facility] [place] updated  Discussed with RN and SLIM  CM will continue to follow

## 2018-12-27 NOTE — UTILIZATION REVIEW
Continued Stay Review    Date: 12/27/2018  Age/Sex: [de-identified] y o  female     Assessment/Plan:   COPD (chronic obstructive pulmonary disease) ([address] )   Assessment & Plan     · No exacerbation at this point  · Continue patient's nebulizations/inhalers  · on chronic oxygen at 2 L  Thus will continue with this  · PT recommends discharge to short-term rehab  · stable      Dementia   Assessment & Plan     · Cont risperdal, aricept at home dosing  · Supportive care  · Daughter agreeable to STR      Encephalopathy-resolved as of 12/26/2018   Assessment & Plan     · According to the ER sign out and report, patient had some alteration mental status in the facility  · Likely disease resolved as patient is presently answering questions appropriately and relatively oriented  Patient likely back at her baseline  Patient has baseline dementia  · Likely disease due to patient's infection/pneumonia  · Observe  · CT scan of the head was negative  · PT recommended short-term rehab      * HCAP (healthcare-associated pneumonia)-resolved as of 12/26/2018   Assessment & Plan     · Came from a personal living facility  · Present on admission, with cough noted in the emergency room and reports of shortness of breath in the facility  Chest x-ray revealed pneumonia  · In the emergency room, patient was given IV cefepime, IV vancomycin and IV azithromycin  · Based on micro results, cefepime was DC , was started on Rocephin IV 12/23/2018  · Total IV antibiotics course since 12/20/2018  · Clinically significantly improved  · Blood cultures positive 1/2 bottles with coagulase-negative Staphylococcus  Likely contaminant, repeat cultures were sent yesterday awaiting result before then discharge    · PT recommends discharge to short-term rehab-accepted [facility] [place] but with h/o MDD needs optioning by PA  · Psychiatry consult placed            VTE Pharmacologic Prophylaxis:   Pharmacologic: Enoxaparin (Lovenox)  Mechanical VTE Prophylaxis in Place: Yes  will continue to require additional inpatient hospital stay due to awaiting optioning process by state with h/o MDD  Discharge Plan: TBD    Vital Signs: /74 (BP Location: Left arm)   Pulse 81   Temp 98 3 °F (36 8 °C) (Oral)   Resp 18   Ht 5' 3" (1 6 m)   Wt 89 3 kg (196 lb 13 9 oz)   SpO2 97%   BMI 34 87 kg/m²     Medications:   Scheduled Meds:   Current Facility-Administered Medications:  acetaminophen 488 mg Oral Q6H PRN Josué Hyde MD   albuterol 2 puff Inhalation Q4H PRN Josué Hyde MD   budesonide-formoterol 2 puff Inhalation BID Josué Hyde MD   cefdinir 300 mg Oral Q12H [facility] Ian Aly MD   cholecalciferol 1,000 Units Oral Daily Josué Hyde MD   citalopram 10 mg Oral Daily Josué Hyde MD   cyanocobalamin 100 mcg Oral Daily Josué Hyde MD   donepezil 10 mg Oral HS Josué Hyde MD   enoxaparin 40 mg Subcutaneous Daily Josué Hyde MD   guaiFENesin 600 mg Oral Q12H [facility] Josué Hyde MD   ondansetron 4 mg Intravenous Q6H PRN Josué Hyde MD   risperiDONE 0 5 mg Oral HS Josué Hyde MD   senna 1 tablet Oral HS PRN Josué Hyde MD   tiotropium 18 mcg Inhalation Daily Josué Hyde MD     Abnormal Labs/Diagnostic Results:       ------------------------------------------------------------------------------------------------------------    Consult PSYCH  Impression/Risk Assessment/Treatment Recommendations:  -Recommended level of care: This pt is an [de-identified] yr old female with hx of dementia and depression, per chart  She came to the hospital and is being treated for pneumonia and AMS  [facility] - the plan is for the pt to be discharged to a short term rehab facility for management of weakness  Per treatment team, here in the hospital, the pt has not been a management issue   She has not been aggressive/agitated, she has not endorsed any SI or HI, she has been compliant with her medications  There have been no other issues  Upon interview the pt is AAOx2 with impoverished thought process but she is very pleasant  She denies having any psychiatric complaints       The patient does not meet criteria for inpatient psychiatric hospitalization, as she is not suicidal, homicidal, grossly psychotic, manic or aggressive/agitated  Thus the patient is psychiatrically stable for discharge         -Recommended pharmacology/therapy/other treatments:     Her current psychiatric medications were reviewed; no other recommendations indicated at this time

## 2018-12-28 VITALS
OXYGEN SATURATION: 95 % | SYSTOLIC BLOOD PRESSURE: 129 MMHG | WEIGHT: 196.87 LBS | HEIGHT: 63 IN | RESPIRATION RATE: 18 BRPM | HEART RATE: 93 BPM | DIASTOLIC BLOOD PRESSURE: 66 MMHG | TEMPERATURE: 98.4 F | BODY MASS INDEX: 34.88 KG/M2

## 2018-12-28 LAB — BACTERIA BLD CULT: NORMAL

## 2018-12-28 PROCEDURE — 99232 SBSQ HOSP IP/OBS MODERATE 35: CPT | Performed by: GENERAL PRACTICE

## 2018-12-28 RX ORDER — DEXTROSE MONOHYDRATE 25 G/50ML
INJECTION, SOLUTION INTRAVENOUS
Status: DISCONTINUED
Start: 2018-12-28 | End: 2018-12-28 | Stop reason: HOSPADM

## 2018-12-28 RX ORDER — CEFDINIR 300 MG/1
300 CAPSULE ORAL EVERY 12 HOURS SCHEDULED
Qty: 2 CAPSULE | Refills: 0 | Status: SHIPPED | OUTPATIENT
Start: 2018-12-28 | End: 2018-12-29

## 2018-12-28 RX ADMIN — BUDESONIDE AND FORMOTEROL FUMARATE DIHYDRATE 2 PUFF: 160; 4.5 AEROSOL RESPIRATORY (INHALATION) at 10:38

## 2018-12-28 RX ADMIN — CEFDINIR 300 MG: 300 CAPSULE ORAL at 10:37

## 2018-12-28 RX ADMIN — VITAMIN D, TAB 1000IU (100/BT) 1000 UNITS: 25 TAB at 10:38

## 2018-12-28 RX ADMIN — CITALOPRAM HYDROBROMIDE 10 MG: 20 TABLET ORAL at 10:38

## 2018-12-28 RX ADMIN — ENOXAPARIN SODIUM 40 MG: 40 INJECTION SUBCUTANEOUS at 10:37

## 2018-12-28 RX ADMIN — GUAIFENESIN 600 MG: 600 TABLET, EXTENDED RELEASE ORAL at 10:37

## 2018-12-28 RX ADMIN — VITAM B12 100 MCG: 100 TAB at 10:37

## 2018-12-28 RX ADMIN — TIOTROPIUM BROMIDE 18 MCG: 18 CAPSULE ORAL; RESPIRATORY (INHALATION) at 10:38

## 2018-12-28 NOTE — SOCIAL WORK
CM received [employer] letter that pt is nursing facility eligible  Discussed with Zeeshan at [facility] [place] who can accept pt at 4:30 PM  Discussed with RN and SLIM  CM placed call to pt's daughter Shekhar Both to discuss at P# [phone] and P# [phone] and VMM left on both  CM will continue to follow and await CB from pt's daughter

## 2018-12-28 NOTE — ASSESSMENT & PLAN NOTE
· Came from a personal living facility  · Present on admission, with cough noted in the emergency room and reports of shortness of breath in the facility  Chest x-ray revealed pneumonia  · In the emergency room, patient was given IV cefepime, IV vancomycin and IV azithromycin  · Based on micro results, cefepime was DC , was started on Rocephin IV 12/23/2018  · Total IV antibiotics course since 12/20/2018  · Clinically significantly improved  · Blood cultures positive 1/2 bottles with coagulase-negative Staphylococcus  Likely contaminant, repeat cultures were sent yesterday awaiting result before then discharge    · PT recommends discharge to short-term rehab-accepted [facility] [place] but with h/o MDD needs optioning by PA  · Awaiting placement

## 2018-12-28 NOTE — ASSESSMENT & PLAN NOTE
· Came from a personal living facility  · Present on admission, with cough noted in the emergency room and reports of shortness of breath in the facility  Chest x-ray revealed pneumonia  · In the emergency room, patient was given IV cefepime, IV vancomycin and IV azithromycin  · Based on micro results, cefepime was DC , was started on Rocephin IV 12/23/2018  · Total IV antibiotics course since 12/20/2018  · Clinically significantly improved  · Blood cultures positive 1/2 bottles with coagulase-negative Staphylococcus  Likely contaminant, repeat cultures were sent yesterday awaiting result before then discharge    · PT recommends discharge to short-term rehab-accepted [facility] [place] but with h/o MDD needs optioning by PA  · FOR STR TODAY

## 2018-12-28 NOTE — DISCHARGE SUMMARY
Discharge- Christen Smith 1940, [de-identified] y o  female MRN: [id number]    Unit/Bed#: -01 Encounter: [id number]    Primary Care Provider: ODILIA Zuniga   Date and time admitted to hospital: 12/19/2018  9:20 AM        Generalized weakness   Assessment & Plan    · PT recommended short-term rehab-accepted at [facility]     COPD (chronic obstructive pulmonary disease) ([address] )   Assessment & Plan    · No exacerbation at this point  · Continue patient's nebulizations/inhalers  · on chronic oxygen at 2 L  Thus will continue with this  · PT recommends discharge to short-term rehab  · stable     Dementia   Assessment & Plan    · Cont risperdal, aricept at home dosing  · Supportive care  · Daughter agreeable to STR     * HCAP (healthcare-associated pneumonia)-resolved as of 12/26/2018   Assessment & Plan    · Came from a personal living facility  · Present on admission, with cough noted in the emergency room and reports of shortness of breath in the facility  Chest x-ray revealed pneumonia  · In the emergency room, patient was given IV cefepime, IV vancomycin and IV azithromycin  · Based on micro results, cefepime was DC , was started on Rocephin IV 12/23/2018  · Total IV antibiotics course since 12/20/2018  · Clinically significantly improved  · Blood cultures positive 1/2 bottles with coagulase-negative Staphylococcus  Likely contaminant, repeat cultures were sent yesterday awaiting result before then discharge    · PT recommends discharge to short-term rehab-accepted [facility] [place] but with h/o MDD needs optioning by PA  · FOR STR TODAY           Discharging Physician / Practitioner: Ely Bueno MD  PCP: Perla Zuniga  Admission Date:   Admission Orders     Ordered        12/19/18 1335  Inpatient Admission (expected length of stay for this patient is greater than two midnights)  Once             Discharge Date: 12/28/18    Resolved Problems  Date Reviewed: 12/28/2018 Resolved    Encephalopathy 12/26/2018     Resolved by  Juan Jose Valle MD    * (Principal)HCAP (healthcare-associated pneumonia) 12/26/2018     Resolved by  Juan Jose Valle MD          Consultations During Hospital Stay:  · psychiatry    Procedures Performed:     ·     Significant Findings / Test Results:     · pneumonia    Incidental Findings:   ·      Test Results Pending at Discharge (will require follow up):   ·      Outpatient Tests Requested:  · cxr 4 weeks    Complications:      Reason for Admission:     Hospital Course:     Clovis Wilson is a [de-identified] y o  female patient who originally presented to the hospital on 12/19/2018 due to weakness  Please see above list of diagnoses and related plan for additional information  Condition at Discharge: stable     Discharge Day Visit / Exam:     * Please refer to separate progress note for these details *    Discussion with Family:     Discharge instructions/Information to patient and family:   See after visit summary for information provided to patient and family  Provisions for Follow-Up Care:  See after visit summary for information related to follow-up care and any pertinent home health orders  Disposition:     Acute Rehab at [facility]    For Discharges to [facility] SNF:   · Not Applicable to this Patient - Not Applicable to this Patient    Planned Readmission:      Discharge Statement:  I spent 40 minutes discharging the patient  This time was spent on the day of discharge  I had direct contact with the patient on the day of discharge  Greater than 50% of the total time was spent examining patient, answering all patient questions, arranging and discussing plan of care with patient as well as directly providing post-discharge instructions  Additional time then spent on discharge activities  Discharge Medications:  See after visit summary for reconciled discharge medications provided to patient and family        ** Please Note: This note has been constructed using a voice recognition system **

## 2018-12-28 NOTE — PLAN OF CARE
Problem: DISCHARGE PLANNING - CARE MANAGEMENT  Goal: Discharge to post-acute care or home with appropriate resources  INTERVENTIONS:  - Conduct assessment to determine patient/family and health care team treatment goals, and need for post-acute services based on payer coverage, community resources, and patient preferences, and barriers to discharge  - Address psychosocial, clinical, and financial barriers to discharge as identified in assessment in conjunction with the patient/family and health care team  - Arrange appropriate level of post-acute services according to patients   needs and preference and payer coverage in collaboration with the physician and health care team  - Communicate with and update the patient/family, physician, and health care team regarding progress on the discharge plan  - Arrange appropriate transportation to post-acute venues   Outcome: Progressing  Spoke with Stalin Lazo and Angela George at [facility] at [place]  Per Angela George she has been in contact with pt's daughter via email  [facility] [place] agreeable to 4:30 PM d/c time - CM previously discussed possible d/c on Friday with pt's daughter who was agreeable  VMM left with discharge time and contact info for weekend  CM  Discussed with CM CC & CM director, RN and SLIM  Medical necessity in chart  CM will continue to follow

## 2018-12-28 NOTE — SOCIAL WORK
Spoke with Zeeshan and General Rivers at [facility] at [place]  Per General Rivers she has been in contact with pt's daughter via email  [facility] [place] agreeable to 4:30 PM d/c time - CM previously discussed possible d/c on Friday with pt's daughter who was agreeable  VMM left with discharge time and contact info for weekend  CM  Discussed with CM CC & CM director, RN and SLIM  Medical necessity in chart  CM will continue to follow

## 2018-12-28 NOTE — SOCIAL WORK
CM left second VMM with pt's daughter to discuss DCP  Discussed with Victorina Cortes at [facility] at [place] who will reach out to pt's daughter as well via email

## 2018-12-28 NOTE — PHYSICAL THERAPY NOTE
Physical Therapy Cancellation Note    Pt refusing PT session at this time  Pt educated on importance of mobility in regaining PLOF + preventing further medical complications  Pt cont to refuse session despite education  nsg + MD aware  Will cont to follow       Maggy Bonilla PT

## 2018-12-28 NOTE — SOCIAL WORK
CM spoke with Benedict Cooney at [facility] who reports pt info was submitted to [facility] this AM and hopeful option letter will be received today  CM placed call to [place] at [facility] and BLS transport tentatively arranged for 4:30 PM with [facility]  Discussed with RN and SLIM  CM will continue to follow

## 2018-12-28 NOTE — PROGRESS NOTES
Progress Note - Chloé Cervantes 1940, [de-identified] y o  female MRN: [id number]    Unit/Bed#: -01 Encounter: [id number]    Primary Care Provider: ODILIA Davenport   Date and time admitted to hospital: 12/19/2018  9:20 AM        COPD (chronic obstructive pulmonary disease) ([address] )   Assessment & Plan    · No exacerbation at this point  · Continue patient's nebulizations/inhalers  · on chronic oxygen at 2 L  Thus will continue with this  · PT recommends discharge to short-term rehab  · stable     Dementia   Assessment & Plan    · Cont risperdal, aricept at home dosing  · Supportive care  · Daughter agreeable to STR     * HCAP (healthcare-associated pneumonia)-resolved as of 12/26/2018   Assessment & Plan    · Came from a personal living facility  · Present on admission, with cough noted in the emergency room and reports of shortness of breath in the facility  Chest x-ray revealed pneumonia  · In the emergency room, patient was given IV cefepime, IV vancomycin and IV azithromycin  · Based on micro results, cefepime was DC , was started on Rocephin IV 12/23/2018  · Total IV antibiotics course since 12/20/2018  · Clinically significantly improved  · Blood cultures positive 1/2 bottles with coagulase-negative Staphylococcus  Likely contaminant, repeat cultures were sent yesterday awaiting result before then discharge  · PT recommends discharge to short-term rehab-accepted [facility] at [place] but with h/o MDD needs optioning by PA  · Awaiting placement         VTE Pharmacologic Prophylaxis:   Pharmacologic: Enoxaparin (Lovenox)  Mechanical VTE Prophylaxis in Place: Yes    Patient Centered Rounds: I have performed bedside rounds with nursing staff today  Discussions with Specialists or Other Care Team Provider:     Education and Discussions with Family / Patient:     Time Spent for Care: 30 minutes  More than 50% of total time spent on counseling and coordination of care as described above      Current Length of Stay: 9 day(s)    Current Patient Status: Inpatient   Certification Statement: The patient will continue to require additional inpatient hospital stay due to awaiting STR    Discharge Plan: STR    Code Status: Level 3 - DNAR and DNI      Subjective:   No c/o  Objective:     Vitals:   Temp (24hrs), Av 5 °F (36 9 °C), Min:98 °F (36 7 °C), Max:98 9 °F (37 2 °C)    Temp:  [98 °F (36 7 °C)-98 9 °F (37 2 °C)] 98 7 °F (37 1 °C)  HR:  [74-81] 78  Resp:  [18] 18  BP: (141-170)/(63-77) 168/69  SpO2:  [94 %-96 %] 96 %  Body mass index is 34 87 kg/m²  Input and Output Summary (last 24 hours): Intake/Output Summary (Last 24 hours) at 18 1103  Last data filed at 18 0330   Gross per 24 hour   Intake              160 ml   Output              250 ml   Net              -90 ml       Physical Exam:     Physical Exam   Constitutional: She appears well-developed and well-nourished  HENT:   Head: Normocephalic and atraumatic  Eyes: Pupils are equal, round, and reactive to light  Cardiovascular: Normal rate and regular rhythm  Pulmonary/Chest: Effort normal and breath sounds normal    Abdominal: Soft  Bowel sounds are normal    Musculoskeletal: She exhibits no edema  Neurological: She is alert  Additional Data:     Labs:      Results from last 7 days  Lab Units 18  0503   WBC Thousand/uL 3 20*   HEMOGLOBIN g/dL 10 3*   HEMATOCRIT % 32 3*   PLATELETS Thousands/uL 177   NEUTROS PCT % 68   LYMPHS PCT % 18   MONOS PCT % 11   EOS PCT % 3       Results from last 7 days  Lab Units 18  0503   SODIUM mmol/L 141   POTASSIUM mmol/L 4 0   CHLORIDE mmol/L 105   CO2 mmol/L 28   BUN mg/dL 7   CREATININE mg/dL 0 76   ANION GAP mmol/L 8   CALCIUM mg/dL 8 2*   GLUCOSE RANDOM mg/dL 93                           * I Have Reviewed All Lab Data Listed Above  * Additional Pertinent Lab Tests Reviewed:  All Labs Within Last 24 Hours Reviewed    Imaging:    Imaging Reports Reviewed Today Include: Imaging Personally Reviewed by Myself Includes:      Recent Cultures (last 7 days):       Results from last 7 days  Lab Units 12/23/18  1230   BLOOD CULTURE  No Growth After 4 Days  Last 24 Hours Medication List:     Current Facility-Administered Medications:  acetaminophen 488 mg Oral Q6H PRN Josué Hyde MD   albuterol 2 puff Inhalation Q4H PRN Josué Hyde MD   budesonide-formoterol 2 puff Inhalation BID Josué Hyde MD   cefdinir 300 mg Oral Q12H [address] Demetria Carpenter MD   cholecalciferol 1,000 Units Oral Daily Josué Hyde MD   citalopram 10 mg Oral Daily Josué Hyde MD   cyanocobalamin 100 mcg Oral Daily Josué Hyde MD   donepezil 10 mg Oral HS Josué Hyde MD   enoxaparin 40 mg Subcutaneous Daily Josué Hyde MD   guaiFENesin 600 mg Oral Q12H [address] Josué Hyde MD   ondansetron 4 mg Intravenous Q6H PRN Josué Hyde MD   risperiDONE 0 5 mg Oral HS Josué Hyde MD   senna 1 tablet Oral HS PRN Josué Hyde MD   tiotropium 18 mcg Inhalation Daily Earl Hyde MD        Today, Patient Was Seen By: Demetria Carpenter MD    ** Please Note: Dictation voice to text software may have been used in the creation of this document   **

## 2018-12-31 ENCOUNTER — TRANSITIONAL CARE MANAGEMENT (OUTPATIENT)
Dept: FAMILY MEDICINE CLINIC | Facility: CLINIC | Age: 78
End: 2018-12-31

## 2020-02-21 ENCOUNTER — TELEPHONE (OUTPATIENT)
Dept: FAMILY MEDICINE CLINIC | Facility: CLINIC | Age: 80
End: 2020-02-21